# Patient Record
Sex: FEMALE | Race: WHITE | NOT HISPANIC OR LATINO | URBAN - METROPOLITAN AREA
[De-identification: names, ages, dates, MRNs, and addresses within clinical notes are randomized per-mention and may not be internally consistent; named-entity substitution may affect disease eponyms.]

---
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
## 2025-04-28 ENCOUNTER — TRANSCRIBE ORDERS (OUTPATIENT)
Dept: ULTRASOUND IMAGING | Facility: HOSPITAL | Age: 25
End: 2025-04-28
Payer: COMMERCIAL

## 2025-04-28 DIAGNOSIS — O28.3 ABNORMAL FETAL ULTRASOUND: Primary | ICD-10-CM

## 2025-04-29 ENCOUNTER — OFFICE VISIT (OUTPATIENT)
Dept: OBSTETRICS AND GYNECOLOGY | Facility: CLINIC | Age: 25
End: 2025-04-29
Payer: COMMERCIAL

## 2025-04-29 ENCOUNTER — HOSPITAL ENCOUNTER (OUTPATIENT)
Dept: ULTRASOUND IMAGING | Facility: HOSPITAL | Age: 25
Discharge: HOME | End: 2025-04-29
Admitting: OBSTETRICS & GYNECOLOGY
Payer: COMMERCIAL

## 2025-04-29 VITALS
TEMPERATURE: 98.2 F | SYSTOLIC BLOOD PRESSURE: 109 MMHG | BODY MASS INDEX: 30.04 KG/M2 | DIASTOLIC BLOOD PRESSURE: 61 MMHG | HEART RATE: 87 BPM | WEIGHT: 153 LBS | OXYGEN SATURATION: 98 % | HEIGHT: 60 IN

## 2025-04-29 DIAGNOSIS — O28.3 ABNORMAL FETAL ULTRASOUND: ICD-10-CM

## 2025-04-29 DIAGNOSIS — Z3A.22 22 WEEKS GESTATION OF PREGNANCY: Primary | ICD-10-CM

## 2025-04-29 DIAGNOSIS — O35.EXX0 FETAL RENAL ANOMALY, SINGLE GESTATION: ICD-10-CM

## 2025-04-29 PROCEDURE — 76811 OB US DETAILED SNGL FETUS: CPT

## 2025-04-29 RX ORDER — PRENATAL VIT NO.126/IRON/FOLIC 28MG-0.8MG
TABLET ORAL DAILY
COMMUNITY

## 2025-04-29 NOTE — LETTER
2025     Yanna Devries MD  [address]  [facility] [address]  [facility] [address]    Patient: Yanna Johnson   YOB: 2000   Date of Visit: 2025       Dear Yanna Devries MD,    Thank you for referring Yanna Johnson to me for evaluation. Below is a copy of my consult note.    If you have questions, please do not hesitate to call me. I look forward to following Yanna along with you.         Sincerely,        Arline Mcintyre MD        CC: No Recipients    Pt reports that she is doing well and denies vaginal bleeding, cramping, contractions or LOF at this time. Reports active fetal movement. Reviewed when to call OB office or present to L&D for evaluation with symptoms such as decreased fetal movement, vaginal bleeding, LOF or ctxs. Pt verbalized understanding. Denies HA, visual changes or epigastric pain. Denies any additional complaints at time of appointment. Next OB appointment scheduled for .    Vitals:    25 0917   BP: 109/61   Pulse: 87   Temp: 98.2 °F (36.8 °C)   SpO2: 98%          MATERNAL FETAL MEDICINE Consult Note    Dear Dr Yanna Devries MD:    Thank you for your kind referral of Yanna Johnson.  As you know, she is a 24 y.o.   22w2d gestation (Estimated Date of Delivery: 25). This is a consult.      Her antepartum course is complicated by:  Right pelvic kidney     Aneuploidy Screening: Declined, discussed today     HPI: No contractions, LOF, vaginal bleeding. Normal fetal movement.   She denies headache, vision changes, shortness of breath, acute changes in edema, and RUQ pain.     Review of History:  History reviewed. No pertinent past medical history.  History reviewed. No pertinent surgical history.    OB Hx: G1     Social History     Socioeconomic History   • Marital status: Single   Tobacco Use   • Smoking status: Former     Types: Cigarettes   Vaping Use   • Vaping status: Never Used   Substance and Sexual Activity   • Alcohol use: Not Currently   • Drug use: Yes  "    Types: Marijuana     History reviewed. No pertinent family history.   No Known Allergies   Current Outpatient Medications on File Prior to Visit   Medication Sig Dispense Refill   • prenatal vitamin (prenatal, CLASSIC, vitamin) tablet Take  by mouth Daily.       No current facility-administered medications on file prior to visit.        Past obstetric, gynecological, medical, surgical, family and social history reviewed.  Relevant lab work and imaging reviewed.    Review of systems  As above in HPI     Vitals:    04/29/25 0917   BP: 109/61   BP Location: Right arm   Patient Position: Sitting   Pulse: 87   Temp: 98.2 °F (36.8 °C)   TempSrc: Temporal   SpO2: 98%   Weight: 69.4 kg (153 lb)   Height: 152.4 cm (60\")       PHYSICAL EXAM   General: No acute distress  Respiratory: No increased work of breathing  Cardiac: reg rate   Abdominal: Gravid, nontender  Extremities: No significant edema  Neuro/psych: Alert and oriented, appropriate mood      ULTRASOUND   Please view full ultrasound note on Imaging tab in ViewPoint.    Breech presentation  Posterior placenta  Fetal biometry is consistent with dates EFW 33%, AC 32%  Suspect right pelvic kidney, right renal artery is visualized.  The left kidney appears normal.  The fetal anatomic survey was successfully obtained and appears normal with exception of the right pelvic kidney.    Counseled regarding the limitation of ultrasounds.     ASSESSMENT/COUNSELING:    Diagnoses and all orders for this visit:    1. 22 weeks gestation of pregnancy (Primary)    2. Fetal renal anomaly, single gestation         Suspected right pelvic kidney  Suspected visualized  Right pelvic kidney today. Patient referred for concern of right renal agenesis. We reviewed that as pregnancy progresses the kidneys become easier to visualize in pregnancy and at times, the bowel can mimic kidney. I was able to visualize a renal artery that is lower than the left and an area that appears to be consistent " with kidney immediately adjacent to the bladder.   I explained that this would not change the management of her pregnancy. I also explained that the baby only needs one functioning kidney. The normal amniotic fluid suggests normal kidney function.      We discussed ectopic kidneys are usually smaller, may be malrotated, and can develop dilatation and obstruction. I saw no evidence of dilatation today, but postnatally this may present. I explained that this does not alter how the baby will do in the immediate  period and should not affect her delivery planning.     When the contralateral kidney is not involved and has normal function, as appears to be the case here, the prenatal course is typically unremarkable and  outcome favorable. There is an increased risk of UTI and later in life of CHTN.       There is not a high rate of known major chromosome disorders linked to this prenatal finding. We discussed the option of NIPS, she declined.   We will send her to peds uro at her next appointment.     Reevaluate kidneys at 28 weeks.     Summary of Plan  -Reevaluation of the fetal kidneys at 28 and 34 weeks   -Serial growth ultrasounds every 4  weeks (by primary OB)   -Starting at 28 - 32 weeks: Weekly fetal  surveillance until delivery   -Starting at 28 weeks: Fetal movement instructions given continue daily until delivery; instructed to report to labor and delivery if cannot achieve more than 10 kicks in one hour or if she perceives a decrease in fetal movement    Follow-up: 6 WEEKS     Thank you for the consult and opportunity to care for this patient.  Please feel free to reach out with any questions or concerns.    I spent 45 minutes caring for this patient on this date of service. This time includes time spent by me in the following activities: preparing for the visit, reviewing tests, obtaining and/or reviewing a separately obtained history, performing a medically appropriate examination  and/or evaluation, counseling and educating the patient/family/caregiver and independently interpreting results and communicating that information with the patient/family/caregiver with greater than 50% spent in counseling and coordination of care.         I spent 10 minutes on the separately reported service of US imaging not included in the time used to support the E/M service also reported today.      Arline Mcintyre MD   Maternal Fetal Medicine-[facility]  Office: [phone]

## 2025-04-29 NOTE — PROGRESS NOTES
"MATERNAL FETAL MEDICINE Consult Note    Dear Dr Yanna Devries MD:    Thank you for your kind referral of Yanna Johnson.  As you know, she is a 24 y.o.   22w2d gestation (Estimated Date of Delivery: 25). This is a consult.      Her antepartum course is complicated by:  Right pelvic kidney     Aneuploidy Screening: Declined, discussed today     HPI: No contractions, LOF, vaginal bleeding. Normal fetal movement.   She denies headache, vision changes, shortness of breath, acute changes in edema, and RUQ pain.     Review of History:  History reviewed. No pertinent past medical history.  History reviewed. No pertinent surgical history.    OB Hx: G1     Social History     Socioeconomic History    Marital status: Single   Tobacco Use    Smoking status: Former     Types: Cigarettes   Vaping Use    Vaping status: Never Used   Substance and Sexual Activity    Alcohol use: Not Currently    Drug use: Yes     Types: Marijuana     History reviewed. No pertinent family history.   No Known Allergies   Current Outpatient Medications on File Prior to Visit   Medication Sig Dispense Refill    prenatal vitamin (prenatal, CLASSIC, vitamin) tablet Take  by mouth Daily.       No current facility-administered medications on file prior to visit.        Past obstetric, gynecological, medical, surgical, family and social history reviewed.  Relevant lab work and imaging reviewed.    Review of systems  As above in HPI     Vitals:    25 0917   BP: 109/61   BP Location: Right arm   Patient Position: Sitting   Pulse: 87   Temp: 98.2 °F (36.8 °C)   TempSrc: Temporal   SpO2: 98%   Weight: 69.4 kg (153 lb)   Height: 152.4 cm (60\")       PHYSICAL EXAM   General: No acute distress  Respiratory: No increased work of breathing  Cardiac: reg rate   Abdominal: Gravid, nontender  Extremities: No significant edema  Neuro/psych: Alert and oriented, appropriate mood      ULTRASOUND   Please view full ultrasound note on Imaging tab in " ViewPoint.    Breech presentation  Posterior placenta  Fetal biometry is consistent with dates EFW 33%, AC 32%  Suspect right pelvic kidney, right renal artery is visualized.  The left kidney appears normal.  The fetal anatomic survey was successfully obtained and appears normal with exception of the right pelvic kidney.    Counseled regarding the limitation of ultrasounds.     ASSESSMENT/COUNSELING:    Diagnoses and all orders for this visit:    1. 22 weeks gestation of pregnancy (Primary)    2. Fetal renal anomaly, single gestation         Suspected right pelvic kidney  Suspected visualized  Right pelvic kidney today. Patient referred for concern of right renal agenesis. We reviewed that as pregnancy progresses the kidneys become easier to visualize in pregnancy and at times, the bowel can mimic kidney. I was able to visualize a renal artery that is lower than the left and an area that appears to be consistent with kidney immediately adjacent to the bladder.   I explained that this would not change the management of her pregnancy. I also explained that the baby only needs one functioning kidney. The normal amniotic fluid suggests normal kidney function.      We discussed ectopic kidneys are usually smaller, may be malrotated, and can develop dilatation and obstruction. I saw no evidence of dilatation today, but postnatally this may present. I explained that this does not alter how the baby will do in the immediate  period and should not affect her delivery planning.     When the contralateral kidney is not involved and has normal function, as appears to be the case here, the prenatal course is typically unremarkable and  outcome favorable. There is an increased risk of UTI and later in life of CHTN.       There is not a high rate of known major chromosome disorders linked to this prenatal finding. We discussed the option of NIPS, she declined.   We will send her to peds uro at her next  appointment.     Reevaluate kidneys at 28 weeks.     Summary of Plan  -Reevaluation of the fetal kidneys at 28 and 34 weeks   -Serial growth ultrasounds every 4  weeks (by primary OB)   -Starting at 28 - 32 weeks: Weekly fetal  surveillance until delivery   -Starting at 28 weeks: Fetal movement instructions given continue daily until delivery; instructed to report to labor and delivery if cannot achieve more than 10 kicks in one hour or if she perceives a decrease in fetal movement    Follow-up: 6 WEEKS     Thank you for the consult and opportunity to care for this patient.  Please feel free to reach out with any questions or concerns.    I spent 45 minutes caring for this patient on this date of service. This time includes time spent by me in the following activities: preparing for the visit, reviewing tests, obtaining and/or reviewing a separately obtained history, performing a medically appropriate examination and/or evaluation, counseling and educating the patient/family/caregiver and independently interpreting results and communicating that information with the patient/family/caregiver with greater than 50% spent in counseling and coordination of care.         I spent 10 minutes on the separately reported service of US imaging not included in the time used to support the E/M service also reported today.      Arline Mcintyre MD   Maternal Fetal Medicine-[facility]  Office: [phone]

## 2025-04-29 NOTE — PROGRESS NOTES
Pt reports that she is doing well and denies vaginal bleeding, cramping, contractions or LOF at this time. Reports active fetal movement. Reviewed when to call OB office or present to L&D for evaluation with symptoms such as decreased fetal movement, vaginal bleeding, LOF or ctxs. Pt verbalized understanding. Denies HA, visual changes or epigastric pain. Denies any additional complaints at time of appointment. Next OB appointment scheduled for 5/6.    Vitals:    04/29/25 0917   BP: 109/61   Pulse: 87   Temp: 98.2 °F (36.8 °C)   SpO2: 98%

## 2025-06-09 ENCOUNTER — TRANSCRIBE ORDERS (OUTPATIENT)
Dept: ULTRASOUND IMAGING | Facility: HOSPITAL | Age: 25
End: 2025-06-09
Payer: COMMERCIAL

## 2025-06-09 DIAGNOSIS — O28.3 ABNORMAL FETAL ULTRASOUND: Primary | ICD-10-CM

## 2025-06-10 ENCOUNTER — HOSPITAL ENCOUNTER (OUTPATIENT)
Dept: ULTRASOUND IMAGING | Facility: HOSPITAL | Age: 25
Discharge: HOME | End: 2025-06-10
Admitting: STUDENT IN AN ORGANIZED HEALTH CARE EDUCATION/TRAINING PROGRAM
Payer: COMMERCIAL

## 2025-06-10 ENCOUNTER — OFFICE VISIT (OUTPATIENT)
Dept: OBSTETRICS AND GYNECOLOGY | Facility: CLINIC | Age: 25
End: 2025-06-10
Payer: COMMERCIAL

## 2025-06-10 VITALS
SYSTOLIC BLOOD PRESSURE: 126 MMHG | HEIGHT: 60 IN | WEIGHT: 158.44 LBS | DIASTOLIC BLOOD PRESSURE: 67 MMHG | BODY MASS INDEX: 31.11 KG/M2 | OXYGEN SATURATION: 99 % | HEART RATE: 86 BPM | TEMPERATURE: 98 F

## 2025-06-10 DIAGNOSIS — O35.EXX0 FETAL RENAL ANOMALY, SINGLE GESTATION: Primary | ICD-10-CM

## 2025-06-10 DIAGNOSIS — O36.5990 FETAL GROWTH RESTRICTION ANTEPARTUM: ICD-10-CM

## 2025-06-10 DIAGNOSIS — O28.3 ABNORMAL FETAL ULTRASOUND: ICD-10-CM

## 2025-06-10 DIAGNOSIS — O36.5990 FETAL GROWTH RESTRICTION ANTEPARTUM: Primary | ICD-10-CM

## 2025-06-10 PROCEDURE — 76816 OB US FOLLOW-UP PER FETUS: CPT

## 2025-06-10 PROCEDURE — 76819 FETAL BIOPHYS PROFIL W/O NST: CPT

## 2025-06-10 NOTE — PROGRESS NOTES
MATERNAL FETAL MEDICINE CONSULT NOTE      Dear Dr Yanna Devries MD:    Thank you for your kind referral of Yanna Johnson.  As you know, she is a 24 y.o.   28w2d gestation (Estimated Date of Delivery: 25). This is a consult.     HER ANTEPARTUM COURSE IS COMPLICATED BY:  Right Pelvic Kidney   Fetal Growth Restriction, Normal Dopplers    ANEUPLOIDY SCREENING: Declined  CARRIER SCREENING: Declined    HPI: Today, denies contractions, LOF, vaginal bleeding. Reports normal fetal movement.   She denies headache, vision changes, shortness of breath, acute changes in edema, and RUQ pain.     REVIEW OF HISTORY  History reviewed. No pertinent past medical history.  History reviewed. No pertinent surgical history.    OB History    Para Term  AB Living   1 0 0 0 0 0   SAB IAB Ectopic Molar Multiple Live Births   0 0 0 0 0 0      # Outcome Date GA Lbr Florian/2nd Weight Sex Type Anes PTL Lv   1 Current              Social History     Socioeconomic History    Marital status: Single   Tobacco Use    Smoking status: Former     Types: Cigarettes    Smokeless tobacco: Never   Vaping Use    Vaping status: Never Used   Substance and Sexual Activity    Alcohol use: Not Currently    Drug use: Not Currently     Types: Marijuana     History reviewed. No pertinent family history.   No Known Allergies   Current Outpatient Medications on File Prior to Visit   Medication Sig Dispense Refill    prenatal vitamin (prenatal, CLASSIC, vitamin) tablet Take  by mouth Daily. (Patient not taking: Reported on 6/10/2025)       No current facility-administered medications on file prior to visit.        Past obstetric, gynecological, medical, surgical, family and social history reviewed.  Relevant lab work and imaging reviewed.    REVIEW OF SYSTEMS  As above in HPI    Vitals:    06/10/25 1013   BP: 126/67   BP Location: Right arm   Patient Position: Sitting   Pulse: 86   Temp: 98 °F (36.7 °C)   TempSrc: Temporal   SpO2: 99%   Weight: 71.9  "kg (158 lb 7 oz)   Height: 152.4 cm (60\")     Wt Readings from Last 3 Encounters:   06/10/25 71.9 kg (158 lb 7 oz)   04/29/25 69.4 kg (153 lb)     BP Readings from Last 3 Encounters:   06/10/25 126/67   04/29/25 109/61     Pulse Readings from Last 3 Encounters:   06/10/25 86   04/29/25 87     Pregnancy Episode    PHYSICAL EXAM   General: No acute distress, pleasant, AAO x 3  Respiratory: No increased work of breathing, speaking in complete sentences without difficulty, symmetric chest rise  Cardiac: Regular rate   abdominal: Gravid, nontender  Extremities: No significant edema  Neuro/psych: Awake, Alert and oriented, appropriate mood/affect    ULTRASOUND   A full ultrasound report can be found under imaging tab once finalized and signed off by reading physician        ASSESSMENT AND PLAN  Diagnoses and all orders for this visit:    1. Fetal renal anomaly, single gestation (Primary)    2. Fetal growth restriction antepartum         RIGHT PELVIC KIDNEY  Previously Counseled.  Right pelvic kidney today.   AGUSTIN is 14 cm, which is normal - the normal amniotic fluid suggests normal kidney function.   Declined NIPT  Peds Uro Referral ordered  Re-evaluate fetal kidneys at 34 weeks    FETAL GROWTH RESTRICTION, NORMAL UMBILICAL ARTERY DOPPLERS  Fetal growth restriction is defined as an EFW or AC measurement of < 10%. Today she has an AC of 9% and meets requirements for FGR diagnosis.   I counseled them on constitutional small vs pathologic causes of fetal growth restriction FGR and management plan according to ACOG/SMFM recommendations. All questions addressed.  We discussed that a fetus can be small secondary to either consitutional or pathologic causes.  Constitutional occurs because of maternal or paternal small status that is inherited in the fetus.  Pathologic causes result from maternal medical conditions, fetal conditions, teratogen exposure, or placental insufficiency including Pre-eclampsia.    FGR is assoicated with " increased risk of stillbirth, abnormal heart rates patterns,  delivery, oligohydramnios, neurodevelopmental delay and cerebral palsy.     She declined genetic screening this pregnancy and baby has right pelvic kidney. I did  her that I can never rule out genetic causes by ultrasound.  With congenital infections, we often see profound CNS findings such as ventriculomegaly, echogenic bowel, and even hydrops.  We do not see any of this today.  Patient would like to have infectious studies (CMV IgG and IgM / Toxo IgG and IgM) today. Patient declined NIPT again today.     She has no underlying maternal medical conditions to account for poor nutrient transport across the placenta.  She does not have pre-eclampsia at this time, but we will continue close surveillance with weekly BP's as this can occur after FGR onset.  I do not see any evidence of fetal infection or fetal cause for FGR.   I discussed the risk of  morbidity,  birth, pre-eclamspia, and stillbirth as the reasons for increased surveillance to prevent these complications if possible.      At this time, we will do twice weekly ANFS with weekly UA dopplers completed at [facility] office.  Will scan for growth every 3-4 weeks after short interval growth in 2 weeks. We would consider admission for corticosteroids, Pre-eclampsia evaluation, and NICU consult if dopplers worsen, or oligohydramnios is noted.  I discussed the increased risk of  morbidity,  birth, and stillbirth related to FGR along with placental abruption and pre-eclampsia.  Delivery timing will be determined based upon interval growth, dopplers, fetal testing, and maternal blood pressures.  Magnesium sulfate for neuroprotection if delivery indicated < 32 weeks.      She doesn't have any readily apparent robust risk factors: she is not a chronic hypertensive and does not smoke, but I counseled her that sometimes we do not know the cause and that this happens early  on when the placenta is developing and it is nothing the patient did or didn't do and she did not cause this.  We discussed that once FGR with placental dysfunction is present, it typically worsens over time during the pregnancy.  Timing of delivery is guided by the results  testing of fetal well being (ultrasound BPP, FHR tracing findings) in addition to Doppler findings (umbilical artery, MCA, and ductus venosus).  Today we have NORMAL umbilical artery dopplers with an 8/8 BPP which is reassuring.      We discussed umbilical artery doppler studies as 4 possible levels: normal, elevated, absent, and reversed.  We discussed that absent and reversed would require admission,  corticosteroids, and possibly delivery depending on gestational age and other testing.        I would recommend limitation of activities, daily fetal movement assessment, and twice weekly ANFS with BPP and Doppler studies.  A worsening of findings on testing would indicate an increased risk of indicated delivery, and inpatient management may be recommended at that time(with steroids).      Patient states she is going to primary OB today for glucose test. She would like to have infectious studies drawn at her primary OB office. Informed Laura Steven NP that pt needed these tests done today. She said she will take care of it. Pt declines NIPT but informed her to let us know if she changes her mind so we can order that and get it sent out to the lab. States understanding. Her biggest concern with NIPT is the cost.  Discussed max out of pocket and financial assistance with [facility]. Pt will consider but declines today.      SUMMARY OF PLAN  -Referral to peds urology ordered  -Toxo/CMV IgG and IgM being drawn today at primary OB office  -Pt declines NIPT  -Short interval growth ultrasound in 2 weeks (MFM)  -Serial growth ultrasound every 3-4 weeks after short interval (MFM)  -Twice Weekly fetal  surveillance until delivery (Split  between MFM (Thursday) and primary OB (Monday))   --MFM will do BPP/UAD   --Ideally if possible, schedule about 2 - 3 days between weekly MFM and OB assessments.   - Fetal movement instructions given continue daily until delivery; instructed to report to labor and delivery if cannot achieve more than 10 kicks in one hour or if she perceives a decrease in fetal movement  -Continue routine prenatal care with primary ob team   -At this time, delivery is recommended at 38-39 weeks, unless indicated sooner for maternal or fetal reasons    Follow-up: Weekly    Future Appointments   Date Time Provider Department Center   7/22/2025  9:00 AM [facility] US 1 [facility]   7/22/2025  9:30 AM Arline Mcintyre MD [facility]     Thank you for the consult and opportunity to care for this patient.  Please feel free to reach out with any questions or concerns.      I confirm that all copied/forwarded documentation in this record has been carefully reviewed, updated as necessary, and accurately reflects the patient's current status and plan of care.      I spent 35 minutes caring for this patient on this date of service. This time includes time spent by me in the following activities: preparing for the visit, reviewing tests, obtaining and/or reviewing a separately obtained history, performing a medically appropriate examination and/or evaluation, counseling and educating the patient/family/caregiver and independently interpreting results and communicating that information with the patient/family/caregiver with greater than 50% spent in counseling and coordination of care.     JAQUAN Man  6/10/2025  Maternal Fetal Medicine-[facility]  Office: [phone]

## 2025-06-10 NOTE — PROGRESS NOTES
Pt reports that she is doing well and denies vaginal bleeding, cramping, contractions or LOF at this time. Reports active fetal movement. Reviewed when to call OB office or present to L&D for evaluation with symptoms such as decreased fetal movement, vaginal bleeding, LOF or ctxs. Pt verbalized understanding. Denies HA, visual changes or epigastric pain. Denies any additional complaints at time of appointment. Next OB appointment scheduled for 6/10.        Vitals:    06/10/25 1013   BP: 126/67   Pulse: 86   Temp: 98 °F (36.7 °C)   SpO2: 99%

## 2025-06-10 NOTE — LETTER
Anjali 10, 2025     Yanna Devries MD  [address]  [facility] [address]  [facility] [address]    Patient: Yanna Johnson   YOB: 2000   Date of Visit: 6/10/2025       Dear Yanna Devries MD,    Thank you for referring Yanna Johnson to me for evaluation. Below is a copy of my consult note.    If you have questions, please do not hesitate to call me. I look forward to following Yanna along with you.         Sincerely,        JAQUAN Christopher        CC: No Recipients    MATERNAL FETAL MEDICINE CONSULT NOTE      Dear Dr Yanna Devries MD:    Thank you for your kind referral of Yanna Johnson.  As you know, she is a 24 y.o.   28w2d gestation (Estimated Date of Delivery: 25). This is a consult.     HER ANTEPARTUM COURSE IS COMPLICATED BY:  Right Pelvic Kidney   Fetal Growth Restriction, Normal Dopplers    ANEUPLOIDY SCREENING: Declined  CARRIER SCREENING: Declined    HPI: Today, denies contractions, LOF, vaginal bleeding. Reports normal fetal movement.   She denies headache, vision changes, shortness of breath, acute changes in edema, and RUQ pain.     REVIEW OF HISTORY  History reviewed. No pertinent past medical history.  History reviewed. No pertinent surgical history.    OB History    Para Term  AB Living   1 0 0 0 0 0   SAB IAB Ectopic Molar Multiple Live Births   0 0 0 0 0 0      # Outcome Date GA Lbr Florian/2nd Weight Sex Type Anes PTL Lv   1 Current              Social History     Socioeconomic History   • Marital status: Single   Tobacco Use   • Smoking status: Former     Types: Cigarettes   • Smokeless tobacco: Never   Vaping Use   • Vaping status: Never Used   Substance and Sexual Activity   • Alcohol use: Not Currently   • Drug use: Not Currently     Types: Marijuana     History reviewed. No pertinent family history.   No Known Allergies   Current Outpatient Medications on File Prior to Visit   Medication Sig Dispense Refill   • prenatal vitamin (prenatal, CLASSIC, vitamin) tablet  "Take  by mouth Daily. (Patient not taking: Reported on 6/10/2025)       No current facility-administered medications on file prior to visit.        Past obstetric, gynecological, medical, surgical, family and social history reviewed.  Relevant lab work and imaging reviewed.    REVIEW OF SYSTEMS  As above in HPI    Vitals:    06/10/25 1013   BP: 126/67   BP Location: Right arm   Patient Position: Sitting   Pulse: 86   Temp: 98 °F (36.7 °C)   TempSrc: Temporal   SpO2: 99%   Weight: 71.9 kg (158 lb 7 oz)   Height: 152.4 cm (60\")     Wt Readings from Last 3 Encounters:   06/10/25 71.9 kg (158 lb 7 oz)   04/29/25 69.4 kg (153 lb)     BP Readings from Last 3 Encounters:   06/10/25 126/67   04/29/25 109/61     Pulse Readings from Last 3 Encounters:   06/10/25 86   04/29/25 87     Pregnancy Episode    PHYSICAL EXAM   General: No acute distress, pleasant, AAO x 3  Respiratory: No increased work of breathing, speaking in complete sentences without difficulty, symmetric chest rise  Cardiac: Regular rate   abdominal: Gravid, nontender  Extremities: No significant edema  Neuro/psych: Awake, Alert and oriented, appropriate mood/affect    ULTRASOUND   A full ultrasound report can be found under imaging tab once finalized and signed off by reading physician        ASSESSMENT AND PLAN  Diagnoses and all orders for this visit:    1. Fetal renal anomaly, single gestation (Primary)    2. Fetal growth restriction antepartum         RIGHT PELVIC KIDNEY  Previously Counseled.  Right pelvic kidney today.   AGUSTIN is 14 cm, which is normal - the normal amniotic fluid suggests normal kidney function.   Declined NIPT  Peds Uro Referral ordered  Re-evaluate fetal kidneys at 34 weeks    FETAL GROWTH RESTRICTION, NORMAL UMBILICAL ARTERY DOPPLERS  Fetal growth restriction is defined as an EFW or AC measurement of < 10%. Today she has an AC of 9% and meets requirements for FGR diagnosis.   I counseled them on constitutional small vs pathologic causes " of fetal growth restriction FGR and management plan according to ACOG/SMFM recommendations. All questions addressed.  We discussed that a fetus can be small secondary to either consitutional or pathologic causes.  Constitutional occurs because of maternal or paternal small status that is inherited in the fetus.  Pathologic causes result from maternal medical conditions, fetal conditions, teratogen exposure, or placental insufficiency including Pre-eclampsia.    FGR is assoicated with increased risk of stillbirth, abnormal heart rates patterns,  delivery, oligohydramnios, neurodevelopmental delay and cerebral palsy.     She declined genetic screening this pregnancy and baby has right pelvic kidney. I did  her that I can never rule out genetic causes by ultrasound.  With congenital infections, we often see profound CNS findings such as ventriculomegaly, echogenic bowel, and even hydrops.  We do not see any of this today.  Patient would like to have infectious studies (CMV IgG and IgM / Toxo IgG and IgM) today. Patient declined NIPT again today.     She has no underlying maternal medical conditions to account for poor nutrient transport across the placenta.  She does not have pre-eclampsia at this time, but we will continue close surveillance with weekly BP's as this can occur after FGR onset.  I do not see any evidence of fetal infection or fetal cause for FGR.   I discussed the risk of  morbidity,  birth, pre-eclamspia, and stillbirth as the reasons for increased surveillance to prevent these complications if possible.      At this time, we will do twice weekly ANFS with weekly UA dopplers completed at [facility] office.  Will scan for growth every 3-4 weeks after short interval growth in 2 weeks. We would consider admission for corticosteroids, Pre-eclampsia evaluation, and NICU consult if dopplers worsen, or oligohydramnios is noted.  I discussed the increased risk of  morbidity,   birth, and stillbirth related to FGR along with placental abruption and pre-eclampsia.  Delivery timing will be determined based upon interval growth, dopplers, fetal testing, and maternal blood pressures.  Magnesium sulfate for neuroprotection if delivery indicated < 32 weeks.      She doesn't have any readily apparent robust risk factors: she is not a chronic hypertensive and does not smoke, but I counseled her that sometimes we do not know the cause and that this happens early on when the placenta is developing and it is nothing the patient did or didn't do and she did not cause this.  We discussed that once FGR with placental dysfunction is present, it typically worsens over time during the pregnancy.  Timing of delivery is guided by the results  testing of fetal well being (ultrasound BPP, FHR tracing findings) in addition to Doppler findings (umbilical artery, MCA, and ductus venosus).  Today we have NORMAL umbilical artery dopplers with an 8/8 BPP which is reassuring.      We discussed umbilical artery doppler studies as 4 possible levels: normal, elevated, absent, and reversed.  We discussed that absent and reversed would require admission,  corticosteroids, and possibly delivery depending on gestational age and other testing.        I would recommend limitation of activities, daily fetal movement assessment, and twice weekly ANFS with BPP and Doppler studies.  A worsening of findings on testing would indicate an increased risk of indicated delivery, and inpatient management may be recommended at that time(with steroids).      Patient states she is going to primary OB today for glucose test. She would like to have infectious studies drawn at her primary OB office. Informed Laura Steven NP that pt needed these tests done today. She said she will take care of it. Pt declines NIPT but informed her to let us know if she changes her mind so we can order that and get it sent out to the lab.  States understanding. Her biggest concern with NIPT is the cost.  Discussed max out of pocket and financial assistance with [facility]. Pt will consider but declines today.      SUMMARY OF PLAN  -Referral to peds urology ordered  -Toxo/CMV IgG and IgM being drawn today at primary OB office  -Pt declines NIPT  -Short interval growth ultrasound in 2 weeks (MFM)  -Serial growth ultrasound every 3-4 weeks after short interval (MFM)  -Twice Weekly fetal  surveillance until delivery (Split between MFM (Thursday) and primary OB (Monday))   --MFM will do BPP/UAD   --Ideally if possible, schedule about 2 - 3 days between weekly MFM and OB assessments.   - Fetal movement instructions given continue daily until delivery; instructed to report to labor and delivery if cannot achieve more than 10 kicks in one hour or if she perceives a decrease in fetal movement  -Continue routine prenatal care with primary ob team   -At this time, delivery is recommended at 38-39 weeks, unless indicated sooner for maternal or fetal reasons    Follow-up: Weekly    Future Appointments   Date Time Provider Department Center   2025  9:00 AM [facility] US 1  [facility]   2025  9:30 AM Arline Mcintyre MD [facility]     Thank you for the consult and opportunity to care for this patient.  Please feel free to reach out with any questions or concerns.      I confirm that all copied/forwarded documentation in this record has been carefully reviewed, updated as necessary, and accurately reflects the patient's current status and plan of care.      I spent 35 minutes caring for this patient on this date of service. This time includes time spent by me in the following activities: preparing for the visit, reviewing tests, obtaining and/or reviewing a separately obtained history, performing a medically appropriate examination and/or evaluation, counseling and educating the patient/family/caregiver and independently interpreting results and  communicating that information with the patient/family/caregiver with greater than 50% spent in counseling and coordination of care.     Malgorzata Seals, JAQUAN  6/10/2025  Maternal Fetal Medicine-[facility]  Office: [phone]    Pt reports that she is doing well and denies vaginal bleeding, cramping, contractions or LOF at this time. Reports active fetal movement. Reviewed when to call OB office or present to L&D for evaluation with symptoms such as decreased fetal movement, vaginal bleeding, LOF or ctxs. Pt verbalized understanding. Denies HA, visual changes or epigastric pain. Denies any additional complaints at time of appointment. Next OB appointment scheduled for 6/10.        Vitals:    06/10/25 1013   BP: 126/67   Pulse: 86   Temp: 98 °F (36.7 °C)   SpO2: 99%

## 2025-06-10 NOTE — LETTER
Anjali 10, 2025     Yanna Devries MD  [address]  [facility] [address]  [facility] [address]    Patient: Yanna Johnson   YOB: 2000   Date of Visit: 6/10/2025       Dear Yanna Devries MD    Yanna Johnson was in my office today. Below is a copy of my note.    If you have questions, please do not hesitate to call me. I look forward to following Yanna along with you.         Sincerely,        JAQUAN Christopher        CC: No Recipients    MATERNAL FETAL MEDICINE CONSULT NOTE      Dear Dr Yanna Devries MD:    Thank you for your kind referral of Yanna Johnson.  As you know, she is a 24 y.o.   28w2d gestation (Estimated Date of Delivery: 25). This is a consult.     HER ANTEPARTUM COURSE IS COMPLICATED BY:  Right Pelvic Kidney   Fetal Growth Restriction, Normal Dopplers    ANEUPLOIDY SCREENING: Declined  CARRIER SCREENING: Declined    HPI: Today, denies contractions, LOF, vaginal bleeding. Reports normal fetal movement.   She denies headache, vision changes, shortness of breath, acute changes in edema, and RUQ pain.     REVIEW OF HISTORY  History reviewed. No pertinent past medical history.  History reviewed. No pertinent surgical history.    OB History    Para Term  AB Living   1 0 0 0 0 0   SAB IAB Ectopic Molar Multiple Live Births   0 0 0 0 0 0      # Outcome Date GA Lbr Florian/2nd Weight Sex Type Anes PTL Lv   1 Current              Social History     Socioeconomic History   • Marital status: Single   Tobacco Use   • Smoking status: Former     Types: Cigarettes   • Smokeless tobacco: Never   Vaping Use   • Vaping status: Never Used   Substance and Sexual Activity   • Alcohol use: Not Currently   • Drug use: Not Currently     Types: Marijuana     History reviewed. No pertinent family history.   No Known Allergies   Current Outpatient Medications on File Prior to Visit   Medication Sig Dispense Refill   • prenatal vitamin (prenatal, CLASSIC, vitamin) tablet Take  by mouth Daily. (Patient not  "taking: Reported on 6/10/2025)       No current facility-administered medications on file prior to visit.        Past obstetric, gynecological, medical, surgical, family and social history reviewed.  Relevant lab work and imaging reviewed.    REVIEW OF SYSTEMS  As above in HPI    Vitals:    06/10/25 1013   BP: 126/67   BP Location: Right arm   Patient Position: Sitting   Pulse: 86   Temp: 98 °F (36.7 °C)   TempSrc: Temporal   SpO2: 99%   Weight: 71.9 kg (158 lb 7 oz)   Height: 152.4 cm (60\")     Wt Readings from Last 3 Encounters:   06/10/25 71.9 kg (158 lb 7 oz)   04/29/25 69.4 kg (153 lb)     BP Readings from Last 3 Encounters:   06/10/25 126/67   04/29/25 109/61     Pulse Readings from Last 3 Encounters:   06/10/25 86   04/29/25 87     Pregnancy Episode    PHYSICAL EXAM   General: No acute distress, pleasant, AAO x 3  Respiratory: No increased work of breathing, speaking in complete sentences without difficulty, symmetric chest rise  Cardiac: Regular rate   abdominal: Gravid, nontender  Extremities: No significant edema  Neuro/psych: Awake, Alert and oriented, appropriate mood/affect    ULTRASOUND   A full ultrasound report can be found under imaging tab once finalized and signed off by reading physician        ASSESSMENT AND PLAN  Diagnoses and all orders for this visit:    1. Fetal renal anomaly, single gestation (Primary)    2. Fetal growth restriction antepartum         RIGHT PELVIC KIDNEY  Previously Counseled.  Right pelvic kidney today.   AGUSTIN is 14 cm, which is normal - the normal amniotic fluid suggests normal kidney function.   Declined NIPT  Peds Uro Referral ordered  Re-evaluate fetal kidneys at 34 weeks    FETAL GROWTH RESTRICTION, NORMAL UMBILICAL ARTERY DOPPLERS  Fetal growth restriction is defined as an EFW or AC measurement of < 10%. Today she has an AC of 9% and meets requirements for FGR diagnosis.   I counseled them on constitutional small vs pathologic causes of fetal growth restriction FGR " and management plan according to ACOG/SMFM recommendations. All questions addressed.  We discussed that a fetus can be small secondary to either consitutional or pathologic causes.  Constitutional occurs because of maternal or paternal small status that is inherited in the fetus.  Pathologic causes result from maternal medical conditions, fetal conditions, teratogen exposure, or placental insufficiency including Pre-eclampsia.    FGR is assoicated with increased risk of stillbirth, abnormal heart rates patterns,  delivery, oligohydramnios, neurodevelopmental delay and cerebral palsy.     She declined genetic screening this pregnancy and baby has right pelvic kidney. I did  her that I can never rule out genetic causes by ultrasound.  With congenital infections, we often see profound CNS findings such as ventriculomegaly, echogenic bowel, and even hydrops.  We do not see any of this today.  Patient would like to have infectious studies (CMV IgG and IgM / Toxo IgG and IgM) today. Patient declined NIPT again today.     She has no underlying maternal medical conditions to account for poor nutrient transport across the placenta.  She does not have pre-eclampsia at this time, but we will continue close surveillance with weekly BP's as this can occur after FGR onset.  I do not see any evidence of fetal infection or fetal cause for FGR.   I discussed the risk of  morbidity,  birth, pre-eclamspia, and stillbirth as the reasons for increased surveillance to prevent these complications if possible.      At this time, we will do twice weekly ANFS with weekly UA dopplers completed at [facility] office.  Will scan for growth every 3-4 weeks after short interval growth in 2 weeks. We would consider admission for corticosteroids, Pre-eclampsia evaluation, and NICU consult if dopplers worsen, or oligohydramnios is noted.  I discussed the increased risk of  morbidity,  birth, and stillbirth  related to FGR along with placental abruption and pre-eclampsia.  Delivery timing will be determined based upon interval growth, dopplers, fetal testing, and maternal blood pressures.  Magnesium sulfate for neuroprotection if delivery indicated < 32 weeks.      She doesn't have any readily apparent robust risk factors: she is not a chronic hypertensive and does not smoke, but I counseled her that sometimes we do not know the cause and that this happens early on when the placenta is developing and it is nothing the patient did or didn't do and she did not cause this.  We discussed that once FGR with placental dysfunction is present, it typically worsens over time during the pregnancy.  Timing of delivery is guided by the results  testing of fetal well being (ultrasound BPP, FHR tracing findings) in addition to Doppler findings (umbilical artery, MCA, and ductus venosus).  Today we have NORMAL umbilical artery dopplers with an 8/8 BPP which is reassuring.      We discussed umbilical artery doppler studies as 4 possible levels: normal, elevated, absent, and reversed.  We discussed that absent and reversed would require admission,  corticosteroids, and possibly delivery depending on gestational age and other testing.        I would recommend limitation of activities, daily fetal movement assessment, and twice weekly ANFS with BPP and Doppler studies.  A worsening of findings on testing would indicate an increased risk of indicated delivery, and inpatient management may be recommended at that time(with steroids).      Patient states she is going to primary OB today for glucose test. She would like to have infectious studies drawn at her primary OB office. Informed Laura Steven NP that pt needed these tests done today. She said she will take care of it. Pt declines NIPT but informed her to let us know if she changes her mind so we can order that and get it sent out to the lab. States understanding. Her biggest  concern with NIPT is the cost.  Discussed max out of pocket and financial assistance with [facility]. Pt will consider but declines today.      SUMMARY OF PLAN  -Referral to peds urology ordered  -Toxo/CMV IgG and IgM being drawn today at primary OB office  -Pt declines NIPT  -Short interval growth ultrasound in 2 weeks (MFM)  -Serial growth ultrasound every 3-4 weeks after short interval (MFM)  -Twice Weekly fetal  surveillance until delivery (Split between MFM (Thursday) and primary OB (Monday))   --MFM will do BPP/UAD   --Ideally if possible, schedule about 2 - 3 days between weekly MFM and OB assessments.   - Fetal movement instructions given continue daily until delivery; instructed to report to labor and delivery if cannot achieve more than 10 kicks in one hour or if she perceives a decrease in fetal movement  -Continue routine prenatal care with primary ob team   -At this time, delivery is recommended at 38-39 weeks, unless indicated sooner for maternal or fetal reasons    Follow-up: Weekly    Future Appointments   Date Time Provider Department Center   2025  9:00 AM [facility] US 1 [facility]   2025  9:30 AM Arline Mcintyre MD [facility]     Thank you for the consult and opportunity to care for this patient.  Please feel free to reach out with any questions or concerns.      I confirm that all copied/forwarded documentation in this record has been carefully reviewed, updated as necessary, and accurately reflects the patient's current status and plan of care.      I spent 35 minutes caring for this patient on this date of service. This time includes time spent by me in the following activities: preparing for the visit, reviewing tests, obtaining and/or reviewing a separately obtained history, performing a medically appropriate examination and/or evaluation, counseling and educating the patient/family/caregiver and independently interpreting results and communicating that information  with the patient/family/caregiver with greater than 50% spent in counseling and coordination of care.     Malgorzata SealsJAQUAN  6/10/2025  Maternal Fetal Medicine-[facility]  Office: [phone]    Pt reports that she is doing well and denies vaginal bleeding, cramping, contractions or LOF at this time. Reports active fetal movement. Reviewed when to call OB office or present to L&D for evaluation with symptoms such as decreased fetal movement, vaginal bleeding, LOF or ctxs. Pt verbalized understanding. Denies HA, visual changes or epigastric pain. Denies any additional complaints at time of appointment. Next OB appointment scheduled for 6/10.        Vitals:    06/10/25 1013   BP: 126/67   Pulse: 86   Temp: 98 °F (36.7 °C)   SpO2: 99%

## 2025-06-16 ENCOUNTER — TRANSCRIBE ORDERS (OUTPATIENT)
Dept: ULTRASOUND IMAGING | Facility: HOSPITAL | Age: 25
End: 2025-06-16
Payer: COMMERCIAL

## 2025-06-16 DIAGNOSIS — O36.5990 FETAL GROWTH RESTRICTION ANTEPARTUM: Primary | ICD-10-CM

## 2025-06-16 DIAGNOSIS — O28.3 ABNORMAL FETAL ULTRASOUND: Primary | ICD-10-CM

## 2025-06-26 ENCOUNTER — OFFICE VISIT (OUTPATIENT)
Dept: OBSTETRICS AND GYNECOLOGY | Facility: CLINIC | Age: 25
End: 2025-06-26
Payer: COMMERCIAL

## 2025-06-26 ENCOUNTER — HOSPITAL ENCOUNTER (OUTPATIENT)
Dept: ULTRASOUND IMAGING | Facility: HOSPITAL | Age: 25
Discharge: HOME | End: 2025-06-26
Admitting: OBSTETRICS & GYNECOLOGY
Payer: COMMERCIAL

## 2025-06-26 VITALS
OXYGEN SATURATION: 99 % | WEIGHT: 165 LBS | HEART RATE: 86 BPM | BODY MASS INDEX: 32.22 KG/M2 | DIASTOLIC BLOOD PRESSURE: 72 MMHG | SYSTOLIC BLOOD PRESSURE: 111 MMHG | TEMPERATURE: 98.3 F

## 2025-06-26 DIAGNOSIS — O36.5990 FETAL GROWTH RESTRICTION ANTEPARTUM: Primary | ICD-10-CM

## 2025-06-26 DIAGNOSIS — O35.EXX0 FETAL RENAL ANOMALY, SINGLE GESTATION: ICD-10-CM

## 2025-06-26 PROCEDURE — 76816 OB US FOLLOW-UP PER FETUS: CPT

## 2025-06-26 PROCEDURE — 76819 FETAL BIOPHYS PROFIL W/O NST: CPT

## 2025-06-26 PROCEDURE — 76820 UMBILICAL ARTERY ECHO: CPT

## 2025-06-26 NOTE — LETTER
2025     Yanna Devries MD  [address]  [facility] [address]    Patient: Yanna Johnson   YOB: 2000   Date of Visit: 2025       Dear Yanna Devries MD    Yanna Johnson was in my office today. Below is a copy of my note.    If you have questions, please do not hesitate to call me. I look forward to following Yanna along with you.         Sincerely,        Gwen Bauman MD    MATERNAL FETAL MEDICINE CONSULT NOTE      Dear Dr Yanna Devries MD:    Thank you for your kind referral of Yanna Johnson.  As you know, she is a 24 y.o.   30w4d gestation (Estimated Date of Delivery: 25). This is a consult.     HER ANTEPARTUM COURSE IS COMPLICATED BY:  Right Pelvic Kidney   Fetal Growth Restriction, Normal Dopplers--resolved today possibly    ANEUPLOIDY SCREENING: Declined  CARRIER SCREENING: Declined    HPI: Today, denies contractions, LOF, vaginal bleeding.   She denies headache, vision changes, shortness of breath, acute changes in edema, and RUQ pain. Doing well.  Good movement.      REVIEW OF HISTORY  Past Medical History:   Diagnosis Date   • Anemia      No past surgical history on file.    OB History    Para Term  AB Living   1 0 0 0 0 0   SAB IAB Ectopic Molar Multiple Live Births   0 0 0 0 0 0      # Outcome Date GA Lbr Florian/2nd Weight Sex Type Anes PTL Lv   1 Current              Social History     Socioeconomic History   • Marital status: Single   Tobacco Use   • Smoking status: Former     Types: Cigarettes   • Smokeless tobacco: Never   Vaping Use   • Vaping status: Never Used   Substance and Sexual Activity   • Alcohol use: Not Currently   • Drug use: Not Currently     Types: Marijuana     No family history on file.   No Known Allergies   Current Outpatient Medications on File Prior to Visit   Medication Sig Dispense Refill   • prenatal vitamin (prenatal, CLASSIC, vitamin) tablet Take  by mouth Daily. (Patient not taking: Reported on 2025)       No  current facility-administered medications on file prior to visit.        Past obstetric, gynecological, medical, surgical, family and social history reviewed.  Relevant lab work and imaging reviewed.    REVIEW OF SYSTEMS  As above in HPI    Vitals:    06/26/25 1010   BP: 111/72   BP Location: Right arm   Patient Position: Sitting   Pulse: 86   Temp: 98.3 °F (36.8 °C)   TempSrc: Temporal   SpO2: 99%   Weight: 74.8 kg (165 lb)       Wt Readings from Last 3 Encounters:   06/26/25 74.8 kg (165 lb)   06/10/25 71.9 kg (158 lb 7 oz)   04/29/25 69.4 kg (153 lb)     BP Readings from Last 3 Encounters:   06/26/25 111/72   06/10/25 126/67   04/29/25 109/61     Pulse Readings from Last 3 Encounters:   06/26/25 86   06/10/25 86   04/29/25 87     Pregnancy Episode    PHYSICAL EXAM   General: No acute distress, pleasant, AAO x 3  Respiratory: No increased work of breathing, speaking in complete sentences without difficulty, symmetric chest rise  Cardiac: Regular rate   abdominal: Gravid, nontender  Extremities: No significant edema  Neuro/psych: Awake, Alert and oriented, appropriate mood/affect    ULTRASOUND   A full ultrasound report can be found under imaging tab once finalized and signed off by reading physician  Cephalic presentation.  Posterior placenta.  AGUSTIN 19 cm, which is normal.   EFW 1502 g (23%, AC 22%)  BPP 8/8.   UA dopplers intermittently elevated (3/6 elevated).  No absent or reversed end diastolic flow noted.   Rt kidney appears midline/pelvic.  No urinary tract dilation seen.       ASSESSMENT AND PLAN  Diagnoses and all orders for this visit:    1. Fetal growth restriction antepartum (Primary)    2. Fetal renal anomaly, single gestation           RIGHT PELVIC KIDNEY  Previously Counseled.  Right pelvic kidney today--appears more midabdomen than pelvic.   AGUSTIN is 14 cm, which is normal - the normal amniotic fluid suggests normal kidney function.   Declined NIPT  Peds Uro Referral ordered  Re-evaluate fetal kidneys  at 34 weeks    FETAL GROWTH RESTRICTION, NORMAL UMBILICAL ARTERY DOPPLERS--POSSIBLY RESOLVED  Previously Counseled.   -Diagnosed based on AC 9%  -Declined NIPT, due to cost  -Growth normal today--elevated UA dopplers--will plan to repeat growth in 2 weeks short interval and do weekly dopplers until then.  If dopplers mostly normal and next growth reassuring could de-escalate monitoring.          SUMMARY OF PLAN  -Referral to peds urology - scheduled 25  -Toxo/CMV IgG and IgM being drawn today at primary OB office  -Pt declines NIPT  -Short interval growth ultrasound in 2 weeks (MFM)  -Serial growth ultrasound every 3-4 weeks after short interval (MFM)  -Twice Weekly fetal  surveillance until delivery (Split between MFM (Thursday) and primary OB (Monday))   --MFM will do BPP/UAD   --Ideally if possible, schedule about 2 - 3 days between weekly MFM and OB assessments.   -Growth normal today--elevated UA dopplers--will plan to repeat growth in 2 weeks short interval and do weekly dopplers until then.  If dopplers mostly normal and next growth reassuring could de-escalate monitoring.    -Continue routine prenatal care with primary ob team   -At this time, delivery TBD pending next growth    Follow-up: Weekly    Future Appointments   Date Time Provider Department Center   7/3/2025  9:00 AM [facility] US 1 [facility]   7/3/2025  9:30 AM Gwen Bauman MD [facility]   7/10/2025  9:00 AM [facility] US 1 [facility] US [facility]   7/10/2025  9:30 AM Arline Mcintyre MD [facility]   2025  9:00 AM [facility] US 1 [facility] US [facility]   2025  9:30 AM Jonathan Calderon MD [facility]   2025  9:00 AM [facility] US 1 [facility] US [facility]   2025  9:30 AM Arline Mcintyre MD [facility]   2025  9:00 AM [facility] US 1 BH [facility] US [facility]   2025  9:30 AM Gwen Bauman MD [facility]   2025  9:00 AM [facility] US 1  [facility] US [facility]   2025  9:30 AM Malgorzata Seals APRN [facility]    8/14/2025  9:00 AM [facility] US 1 BH [facility] US [facility]   8/14/2025  9:30 AM Malgorzata Seals APRN [facility]   8/21/2025  9:00 AM [facility] US 1 BH [facility] US [facility]   8/21/2025  9:30 AM Malgorzata Seals APRN [facility]   8/28/2025  9:00 AM [facility] US 1 BH [facility] US [facility]   8/28/2025  9:30 AM Gwen Bauman MD [facility]     Thank you for the consult and opportunity to care for this patient.  Please feel free to reach out with any questions or concerns.      I confirm that all copied/forwarded documentation in this record has been carefully reviewed, updated as necessary, and accurately reflects the patient's current status and plan of care.       I spent 20 minutes caring for this patient on this date of service. This time includes time spent by me in the following activities: preparing for the visit, reviewing tests, obtaining and/or reviewing a separately obtained history, performing a medically appropriate examination and/or evaluation, counseling and educating the patient/family/caregiver and independently interpreting results and communicating that information with the patient/family/caregiver with greater than 50% spent in counseling and coordination of care.     4 minutes reading US.     Gwen Bauman MD FACOG  Maternal Fetal Medicine-[facility]  Office: [phone]  [email or web address]

## 2025-06-26 NOTE — PROGRESS NOTES
MATERNAL FETAL MEDICINE CONSULT NOTE      Dear Dr Yanna Devries MD:    Thank you for your kind referral of Yanna Johnson.  As you know, she is a 24 y.o.   30w4d gestation (Estimated Date of Delivery: 25). This is a consult.     HER ANTEPARTUM COURSE IS COMPLICATED BY:  Right Pelvic Kidney   Fetal Growth Restriction, Normal Dopplers--resolved today possibly    ANEUPLOIDY SCREENING: Declined  CARRIER SCREENING: Declined    HPI: Today, denies contractions, LOF, vaginal bleeding.   She denies headache, vision changes, shortness of breath, acute changes in edema, and RUQ pain. Doing well.  Good movement.      REVIEW OF HISTORY  Past Medical History:   Diagnosis Date    Anemia      No past surgical history on file.    OB History    Para Term  AB Living   1 0 0 0 0 0   SAB IAB Ectopic Molar Multiple Live Births   0 0 0 0 0 0      # Outcome Date GA Lbr Florian/2nd Weight Sex Type Anes PTL Lv   1 Current              Social History     Socioeconomic History    Marital status: Single   Tobacco Use    Smoking status: Former     Types: Cigarettes    Smokeless tobacco: Never   Vaping Use    Vaping status: Never Used   Substance and Sexual Activity    Alcohol use: Not Currently    Drug use: Not Currently     Types: Marijuana     No family history on file.   No Known Allergies   Current Outpatient Medications on File Prior to Visit   Medication Sig Dispense Refill    prenatal vitamin (prenatal, CLASSIC, vitamin) tablet Take  by mouth Daily. (Patient not taking: Reported on 2025)       No current facility-administered medications on file prior to visit.        Past obstetric, gynecological, medical, surgical, family and social history reviewed.  Relevant lab work and imaging reviewed.    REVIEW OF SYSTEMS  As above in HPI    Vitals:    25 1010   BP: 111/72   BP Location: Right arm   Patient Position: Sitting   Pulse: 86   Temp: 98.3 °F (36.8 °C)   TempSrc: Temporal   SpO2: 99%   Weight: 74.8 kg (165 lb)        Wt Readings from Last 3 Encounters:   06/26/25 74.8 kg (165 lb)   06/10/25 71.9 kg (158 lb 7 oz)   04/29/25 69.4 kg (153 lb)     BP Readings from Last 3 Encounters:   06/26/25 111/72   06/10/25 126/67   04/29/25 109/61     Pulse Readings from Last 3 Encounters:   06/26/25 86   06/10/25 86   04/29/25 87     Pregnancy Episode    PHYSICAL EXAM   General: No acute distress, pleasant, AAO x 3  Respiratory: No increased work of breathing, speaking in complete sentences without difficulty, symmetric chest rise  Cardiac: Regular rate   abdominal: Gravid, nontender  Extremities: No significant edema  Neuro/psych: Awake, Alert and oriented, appropriate mood/affect    ULTRASOUND   A full ultrasound report can be found under imaging tab once finalized and signed off by reading physician  Cephalic presentation.  Posterior placenta.  AGUSTIN 19 cm, which is normal.   EFW 1502 g (23%, AC 22%)  BPP 8/8.   UA dopplers intermittently elevated (3/6 elevated).  No absent or reversed end diastolic flow noted.   Rt kidney appears midline/pelvic.  No urinary tract dilation seen.       ASSESSMENT AND PLAN  Diagnoses and all orders for this visit:    1. Fetal growth restriction antepartum (Primary)    2. Fetal renal anomaly, single gestation           RIGHT PELVIC KIDNEY  Previously Counseled.  Right pelvic kidney today--appears more midabdomen than pelvic.   AGUSTIN is 14 cm, which is normal - the normal amniotic fluid suggests normal kidney function.   Declined NIPT  Peds Uro Referral ordered  Re-evaluate fetal kidneys at 34 weeks    FETAL GROWTH RESTRICTION, NORMAL UMBILICAL ARTERY DOPPLERS--POSSIBLY RESOLVED  Previously Counseled.   -Diagnosed based on AC 9%  -Declined NIPT, due to cost  -Growth normal today--elevated UA dopplers--will plan to repeat growth in 2 weeks short interval and do weekly dopplers until then.  If dopplers mostly normal and next growth reassuring could de-escalate monitoring.          SUMMARY OF PLAN  -Referral to  peds urology - scheduled 25  -Toxo/CMV IgG and IgM being drawn today at primary OB office  -Pt declines NIPT  -Short interval growth ultrasound in 2 weeks (MFM)  -Serial growth ultrasound every 3-4 weeks after short interval (MFM)  -Twice Weekly fetal  surveillance until delivery (Split between MFM (Thursday) and primary OB (Monday))   --MFM will do BPP/UAD   --Ideally if possible, schedule about 2 - 3 days between weekly MFM and OB assessments.   -Growth normal today--elevated UA dopplers--will plan to repeat growth in 2 weeks short interval and do weekly dopplers until then.  If dopplers mostly normal and next growth reassuring could de-escalate monitoring.    -Continue routine prenatal care with primary ob team   -At this time, delivery TBD pending next growth    Follow-up: Weekly    Future Appointments   Date Time Provider Department Center   7/3/2025  9:00 AM [facility] US 1 [facility] US [facility]   7/3/2025  9:30 AM Gwen Bauman MD [facility]   7/10/2025  9:00 AM [facility] US 1 BH [facility] US [facility]   7/10/2025  9:30 AM Arline Mcintyre MD [facility]   2025  9:00 AM [facility] US 1 BH [facility] US [facility]   2025  9:30 AM Jonathan Calderon MD [facility]   2025  9:00 AM [facility] US 1 BH [facility] US [facility]   2025  9:30 AM Arline Mcintyre MD [facility]   2025  9:00 AM [facility] US 1 BH [facility] US [facility]   2025  9:30 AM Gwen Bauman MD [facility]   2025  9:00 AM [facility] US 1 BH [facility] US [facility]   2025  9:30 AM Malgorzata Seals APRN [facility]   2025  9:00 AM [facility] US 1 BH [facility] US [facility]   2025  9:30 AM Malgorzata Seals APRN [facility]   2025  9:00 AM [facility] US 1 BH [facility] US [facility]   2025  9:30 AM Malgorzata Seals APRN [facility]   2025  9:00 AM [facility] US 1 BH [facility] US [facility]   2025  9:30 AM Gwen Bauman MD [facility]     Thank you for the consult and opportunity to care for this patient.  Please feel free to  reach out with any questions or concerns.      I confirm that all copied/forwarded documentation in this record has been carefully reviewed, updated as necessary, and accurately reflects the patient's current status and plan of care.       I spent 20 minutes caring for this patient on this date of service. This time includes time spent by me in the following activities: preparing for the visit, reviewing tests, obtaining and/or reviewing a separately obtained history, performing a medically appropriate examination and/or evaluation, counseling and educating the patient/family/caregiver and independently interpreting results and communicating that information with the patient/family/caregiver with greater than 50% spent in counseling and coordination of care.     4 minutes reading US.     Gwen Bauman MD [facility]  Maternal Fetal Medicine-Psych[facility]  Office: [phone]  [email or web address]

## 2025-06-26 NOTE — PROGRESS NOTES
Pt reports that she is doing well and denies vaginal bleeding or LOF at this time. Notes irregular [place] ctxs, denies consistency. Reports active fetal movement. Reviewed when to call OB office or present to L&D for evaluation with symptoms such as decreased fetal movement, vaginal bleeding, LOF or ctxs. Pt verbalized understanding. Denies HA, visual changes or epigastric pain. Denies any additional complaints at time of appointment. Next OB appointment scheduled for early next week.     Vitals:    06/26/25 1010   BP: 111/72   Pulse: 86   Temp: 98.3 °F (36.8 °C)   SpO2: 99%

## 2025-07-03 ENCOUNTER — TELEPHONE (OUTPATIENT)
Dept: ULTRASOUND IMAGING | Facility: HOSPITAL | Age: 25
End: 2025-07-03
Payer: COMMERCIAL

## 2025-07-10 ENCOUNTER — OFFICE VISIT (OUTPATIENT)
Dept: OBSTETRICS AND GYNECOLOGY | Facility: CLINIC | Age: 25
End: 2025-07-10
Payer: COMMERCIAL

## 2025-07-10 ENCOUNTER — HOSPITAL ENCOUNTER (OUTPATIENT)
Dept: ULTRASOUND IMAGING | Facility: HOSPITAL | Age: 25
Discharge: HOME | End: 2025-07-10
Admitting: OBSTETRICS & GYNECOLOGY
Payer: COMMERCIAL

## 2025-07-10 VITALS
TEMPERATURE: 97.1 F | DIASTOLIC BLOOD PRESSURE: 71 MMHG | BODY MASS INDEX: 33.83 KG/M2 | WEIGHT: 172.3 LBS | SYSTOLIC BLOOD PRESSURE: 110 MMHG | OXYGEN SATURATION: 98 % | HEART RATE: 88 BPM | HEIGHT: 60 IN

## 2025-07-10 DIAGNOSIS — O35.EXX0 FETAL RENAL ANOMALY, SINGLE GESTATION: ICD-10-CM

## 2025-07-10 DIAGNOSIS — O36.5990 FETAL GROWTH RESTRICTION ANTEPARTUM: Primary | ICD-10-CM

## 2025-07-10 DIAGNOSIS — Z3A.32 32 WEEKS GESTATION OF PREGNANCY: ICD-10-CM

## 2025-07-10 PROCEDURE — 76820 UMBILICAL ARTERY ECHO: CPT

## 2025-07-10 PROCEDURE — 76819 FETAL BIOPHYS PROFIL W/O NST: CPT

## 2025-07-10 PROCEDURE — 76816 OB US FOLLOW-UP PER FETUS: CPT

## 2025-07-10 NOTE — LETTER
July 10, 2025     Yanna Devries MD  [address]  [facility] [address]  [facility] [address]    Patient: Yanna Johnson   YOB: 2000   Date of Visit: 7/10/2025       Dear Yanna Devries MD    Yanna Johnson was in my office today. Below is a copy of my note.    If you have questions, please do not hesitate to call me. I look forward to following Yanna along with you.         Sincerely,        Arline Mcintyre MD        CC: No Recipients    MATERNAL FETAL MEDICINE CONSULT NOTE      Dear Dr Yanna Devries MD:    Thank you for your kind referral of Yanna Johnson.  As you know, she is a 24 y.o.   32w4d gestation (Estimated Date of Delivery: 25). This is a consult.     HER ANTEPARTUM COURSE IS COMPLICATED BY:  Right Pelvic Kidney   Fetal Growth Restriction, Normal Dopplers--resolved today possibly    ANEUPLOIDY SCREENING: Declined  CARRIER SCREENING: Declined    HPI: Today, denies contractions, LOF, vaginal bleeding.   She denies headache, vision changes, shortness of breath, acute changes in edema, and RUQ pain. Doing well.  Good movement.      REVIEW OF HISTORY  Past Medical History:   Diagnosis Date   • Anemia      No past surgical history on file.    OB History    Para Term  AB Living   1 0 0 0 0 0   SAB IAB Ectopic Molar Multiple Live Births   0 0 0 0 0 0      # Outcome Date GA Lbr Florian/2nd Weight Sex Type Anes PTL Lv   1 Current              Social History     Socioeconomic History   • Marital status: Single   Tobacco Use   • Smoking status: Former     Types: Cigarettes   • Smokeless tobacco: Never   Vaping Use   • Vaping status: Never Used   Substance and Sexual Activity   • Alcohol use: Not Currently   • Drug use: Not Currently     Types: Marijuana     No family history on file.   No Known Allergies   Current Outpatient Medications on File Prior to Visit   Medication Sig Dispense Refill   • prenatal vitamin (prenatal, CLASSIC, vitamin) tablet Take  by mouth Daily.       No current  "facility-administered medications on file prior to visit.        Past obstetric, gynecological, medical, surgical, family and social history reviewed.  Relevant lab work and imaging reviewed.    REVIEW OF SYSTEMS  As above in HPI    Vitals:    07/10/25 0945   BP: 110/71   BP Location: Left arm   Patient Position: Sitting   Pulse: 88   Temp: 97.1 °F (36.2 °C)   TempSrc: Temporal   SpO2: 98%   Weight: 78.2 kg (172 lb 4.8 oz)   Height: 152.4 cm (60\")         Wt Readings from Last 3 Encounters:   07/10/25 78.2 kg (172 lb 4.8 oz)   06/26/25 74.8 kg (165 lb)   06/10/25 71.9 kg (158 lb 7 oz)     BP Readings from Last 3 Encounters:   07/10/25 110/71   06/26/25 111/72   06/10/25 126/67     Pulse Readings from Last 3 Encounters:   07/10/25 88   06/26/25 86   06/10/25 86     Pregnancy Episode    PHYSICAL EXAM   General: No acute distress  Respiratory: No increased work of breathing  Cardiac: reg rate   Abdominal: Gravid, nontender  Extremities: No significant edema  Neuro/psych: Alert and oriented, appropriate mood      ULTRASOUND   A full ultrasound report can be found under imaging tab once finalized and signed off by reading physician  Breech presentation  Posterior placenta  AGUSTIN 11 cm which is normal  Fetal biometry is consistent with fetal growth restriction EFW 17%, AC 9%  Umbilical artery Dopplers are overall normal. 2 out of 6 waveforms are elevated. No evidence of absent or reversed end-diastolic flow  BPP 8/8      ASSESSMENT AND PLAN  Diagnoses and all orders for this visit:    1. Fetal growth restriction antepartum (Primary)    2. Fetal renal anomaly, single gestation    3. 32 weeks gestation of pregnancy             RIGHT PELVIC KIDNEY  Previously Counseled.  Right pelvic kidney today--appears more midabdomen than pelvic.   AGUSTIN is 14 cm, which is normal - the normal amniotic fluid suggests normal kidney function.   Declined NIPT  Peds Uro Referral ordered  Re-evaluate fetal kidneys at 34 weeks    Has appointment " scheduled with pediatric urology.     FETAL GROWTH RESTRICTION, NORMAL UMBILICAL ARTERY DOPPLERS--POSSIBLY RESOLVED  Previously Counseled.   -Diagnosed based on AC 9%  -Declined NIPT, due to cost  -Today: a short interval growth was completed and was consistent with FGR by AC 9%. Overall dopplers were normal today. We will continue twice weekly monitoring alternating between MFM and primary OB. UAD will be completed in MFM office.         SUMMARY OF PLAN  -Referral to peds urology - scheduled 25  -Toxo/CMV IgG and IgM being drawn today at primary OB office  -Pt declines NIPT  -Serial growth ultrasound every 3-4 weeks after short interval (MFM)  -Twice Weekly fetal  surveillance until delivery (Split between MFM (Thursday) and primary OB (Monday))   --MFM will do BPP/UAD   --Ideally if possible, schedule about 2 - 3 days between weekly MFM and OB assessments.   -Continue routine prenatal care with primary ob team   -At this time, delivery 38-39 WEEKS     Follow-up: Weekly    Future Appointments   Date Time Provider Department Center   2025  9:00 AM [facility] US 1 [facility]   2025  9:30 AM Jonathan Calderon MD [facility]   2025  9:00 AM [facility] US 1 BH [facility] US [facility]   2025  9:30 AM Arline Mcintyre MD [facility]   2025  9:00 AM [facility] US 1 BH [facility] US [facility]   2025  9:30 AM Gwen Bauman MD [facility]   2025  9:00 AM [facility] US 1 BH [facility] US [facility]   2025  9:30 AM Malgorzata Seals APRN [facility]   2025  9:00 AM [facility] US 1 BH [facility] US [facility]   2025  9:30 AM Malgorzata Seals APRN [facility]   2025  9:00 AM [facility] US 1 BH [facility] US [facility]   2025  9:30 AM Malgorzata Seals APRN [facility]   2025  9:00 AM [facility] US 1 BH [facility] US [facility]   2025  9:30 AM Gwen Bauman MD [facility]     Thank you for the consult and opportunity to care for this patient.  Please feel free to reach out with any  questions or concerns.      I confirm that all copied/forwarded documentation in this record has been carefully reviewed, updated as necessary, and accurately reflects the patient's current status and plan of care.     I spent 15 minutes caring for this patient on this date of service. This time includes time spent by me in the following activities: preparing for the visit, reviewing tests, obtaining and/or reviewing a separately obtained history, performing a medically appropriate examination and/or evaluation, counseling and educating the patient/family/caregiver and independently interpreting results and communicating that information with the patient/family/caregiver with greater than 50% spent in counseling and coordination of care.         I spent 4 minutes on the separately reported service of US imaging not included in the time used to support the E/M service also reported today.      Arline Mcintyre MD   Maternal Fetal Medicine-[facility]  Office: [phone]      Pt reports that she is doing well and denies vaginal bleeding, cramping, contractions or LOF at this time. Reports active fetal movement. Reviewed when to call OB office or present to L&D for evaluation with symptoms such as decreased fetal movement, vaginal bleeding, LOF or ctxs. Pt verbalized understanding. Denies HA, visual changes or epigastric pain. Denies any additional complaints at time of appointment. Next OB appointment scheduled for 7/21.     Vitals:    07/10/25 0945   BP: 110/71   Pulse: 88   Temp: 97.1 °F (36.2 °C)   SpO2: 98%

## 2025-07-10 NOTE — PROGRESS NOTES
"MATERNAL FETAL MEDICINE CONSULT NOTE      Dear Dr Yanna Devries MD:    Thank you for your kind referral of Yanna Johnson.  As you know, she is a 24 y.o.   32w4d gestation (Estimated Date of Delivery: 25). This is a consult.     HER ANTEPARTUM COURSE IS COMPLICATED BY:  Right Pelvic Kidney   Fetal Growth Restriction, Normal Dopplers--resolved today possibly    ANEUPLOIDY SCREENING: Declined  CARRIER SCREENING: Declined    HPI: Today, denies contractions, LOF, vaginal bleeding.   She denies headache, vision changes, shortness of breath, acute changes in edema, and RUQ pain. Doing well.  Good movement.      REVIEW OF HISTORY  Past Medical History:   Diagnosis Date    Anemia      No past surgical history on file.    OB History    Para Term  AB Living   1 0 0 0 0 0   SAB IAB Ectopic Molar Multiple Live Births   0 0 0 0 0 0      # Outcome Date GA Lbr Florian/2nd Weight Sex Type Anes PTL Lv   1 Current              Social History     Socioeconomic History    Marital status: Single   Tobacco Use    Smoking status: Former     Types: Cigarettes    Smokeless tobacco: Never   Vaping Use    Vaping status: Never Used   Substance and Sexual Activity    Alcohol use: Not Currently    Drug use: Not Currently     Types: Marijuana     No family history on file.   No Known Allergies   Current Outpatient Medications on File Prior to Visit   Medication Sig Dispense Refill    prenatal vitamin (prenatal, CLASSIC, vitamin) tablet Take  by mouth Daily.       No current facility-administered medications on file prior to visit.        Past obstetric, gynecological, medical, surgical, family and social history reviewed.  Relevant lab work and imaging reviewed.    REVIEW OF SYSTEMS  As above in HPI    Vitals:    07/10/25 0945   BP: 110/71   BP Location: Left arm   Patient Position: Sitting   Pulse: 88   Temp: 97.1 °F (36.2 °C)   TempSrc: Temporal   SpO2: 98%   Weight: 78.2 kg (172 lb 4.8 oz)   Height: 152.4 cm (60\")         Wt " Readings from Last 3 Encounters:   07/10/25 78.2 kg (172 lb 4.8 oz)   06/26/25 74.8 kg (165 lb)   06/10/25 71.9 kg (158 lb 7 oz)     BP Readings from Last 3 Encounters:   07/10/25 110/71   06/26/25 111/72   06/10/25 126/67     Pulse Readings from Last 3 Encounters:   07/10/25 88   06/26/25 86   06/10/25 86     Pregnancy Episode    PHYSICAL EXAM   General: No acute distress  Respiratory: No increased work of breathing  Cardiac: reg rate   Abdominal: Gravid, nontender  Extremities: No significant edema  Neuro/psych: Alert and oriented, appropriate mood      ULTRASOUND   A full ultrasound report can be found under imaging tab once finalized and signed off by reading physician  Breech presentation  Posterior placenta  AGUSTIN 11 cm which is normal  Fetal biometry is consistent with fetal growth restriction EFW 17%, AC 9%  Umbilical artery Dopplers are overall normal. 2 out of 6 waveforms are elevated. No evidence of absent or reversed end-diastolic flow  BPP 8/8      ASSESSMENT AND PLAN  Diagnoses and all orders for this visit:    1. Fetal growth restriction antepartum (Primary)    2. Fetal renal anomaly, single gestation    3. 32 weeks gestation of pregnancy             RIGHT PELVIC KIDNEY  Previously Counseled.  Right pelvic kidney today--appears more midabdomen than pelvic.   AGUSTIN is 14 cm, which is normal - the normal amniotic fluid suggests normal kidney function.   Declined NIPT  Peds Uro Referral ordered  Re-evaluate fetal kidneys at 34 weeks    Has appointment scheduled with pediatric urology.     FETAL GROWTH RESTRICTION, NORMAL UMBILICAL ARTERY DOPPLERS--POSSIBLY RESOLVED  Previously Counseled.   -Diagnosed based on AC 9%  -Declined NIPT, due to cost  -Today: a short interval growth was completed and was consistent with FGR by AC 9%. Overall dopplers were normal today. We will continue twice weekly monitoring alternating between M and primary OB. UAD will be completed in M office.         SUMMARY OF  PLAN  -Referral to [facility] - scheduled 25  -Toxo/CMV IgG and IgM being drawn today at primary OB office  -Pt declines NIPT  -Serial growth ultrasound every 3-4 weeks after short interval (MFM)  -Twice Weekly fetal  surveillance until delivery (Split between MFM (Thursday) and primary OB (Monday))   --MFM will do BPP/UAD   --Ideally if possible, schedule about 2 - 3 days between weekly MFM and OB assessments.   -Continue routine prenatal care with primary ob team   -At this time, delivery 38-39 WEEKS     Follow-up: Weekly    Future Appointments   Date Time Provider Department Center   2025  9:00 AM [facility] 1 [facility]   2025  9:30 AM Jonathan Calderon MD [facility]   2025  9:00 AM [facility] 1 [facility]   2025  9:30 AM Arline Mcintyre MD [facility]   2025  9:00 AM [facility] 1 [facility]   2025  9:30 AM Gwen Bauman MD [facility]   2025  9:00 AM [facility] 1 [facility]   2025  9:30 AM Malgorzata Seals APRN [facility]   2025  9:00 AM [facility] US 1 [facility]   2025  9:30 AM Malgorzata Seals APRN [facility]   2025  9:00 AM [facility] US 1 [facility]   2025  9:30 AM Malgorzata Seals APRN [facility]   2025  9:00 AM [facility] US 1 [facility]   2025  9:30 AM Gwen Bauman MD [facility]     Thank you for the consult and opportunity to care for this patient.  Please feel free to reach out with any questions or concerns.      I confirm that all copied/forwarded documentation in this record has been carefully reviewed, updated as necessary, and accurately reflects the patient's current status and plan of care.     I spent 15 minutes caring for this patient on this date of service. This time includes time spent by me in the following activities: preparing for the visit, reviewing tests, obtaining and/or reviewing a separately obtained history,  performing a medically appropriate examination and/or evaluation, counseling and educating the patient/family/caregiver and independently interpreting results and communicating that information with the patient/family/caregiver with greater than 50% spent in counseling and coordination of care.         I spent 4 minutes on the separately reported service of US imaging not included in the time used to support the E/M service also reported today.      Arline Mcintyre MD   Maternal Fetal Medicine-[facility]  Office: [phone]

## 2025-07-10 NOTE — PROGRESS NOTES
Pt reports that she is doing well and denies vaginal bleeding, cramping, contractions or LOF at this time. Reports active fetal movement. Reviewed when to call OB office or present to L&D for evaluation with symptoms such as decreased fetal movement, vaginal bleeding, LOF or ctxs. Pt verbalized understanding. Denies HA, visual changes or epigastric pain. Denies any additional complaints at time of appointment. Next OB appointment scheduled for 7/21.     Vitals:    07/10/25 0945   BP: 110/71   Pulse: 88   Temp: 97.1 °F (36.2 °C)   SpO2: 98%

## 2025-07-14 PROBLEM — O09.40 ENCOUNTER FOR SUPERVISION OF HIGH RISK PREGNANCY WITH GRAND MULTIPARITY, ANTEPARTUM: Status: ACTIVE | Noted: 2025-07-14

## 2025-07-14 PROBLEM — O36.5990 FETAL GROWTH RESTRICTION ANTEPARTUM: Status: ACTIVE | Noted: 2025-07-14

## 2025-07-14 PROBLEM — O35.EXX0 FETAL RENAL ANOMALY, SINGLE GESTATION: Status: ACTIVE | Noted: 2025-07-14

## 2025-07-14 NOTE — PROGRESS NOTES
"MATERNAL FETAL MEDICINE CONSULT NOTE      Dear Dr Yanna Devries MD:    Thank you for your kind referral of Yanna Johnson.  As you know, she is a 24 y.o.   33w4d gestation (Estimated Date of Delivery: 25). This is a consult.     HER ANTEPARTUM COURSE IS COMPLICATED BY:  Right Pelvic Kidney   Fetal Growth Restriction    ANEUPLOIDY SCREENING: Declined  CARRIER SCREENING: Declined    HPI: Today, denies contractions, LOF, vaginal bleeding. Good movement.      REVIEW OF HISTORY  Past Medical History:   Diagnosis Date    Anemia      No past surgical history on file.    OB History    Para Term  AB Living   1 0 0 0 0 0   SAB IAB Ectopic Molar Multiple Live Births   0 0 0 0 0 0      # Outcome Date GA Lbr Florian/2nd Weight Sex Type Anes PTL Lv   1 Current              Social History     Socioeconomic History    Marital status: Single   Tobacco Use    Smoking status: Former     Types: Cigarettes    Smokeless tobacco: Never   Vaping Use    Vaping status: Never Used   Substance and Sexual Activity    Alcohol use: Not Currently    Drug use: Not Currently     Types: Marijuana     No family history on file.   No Known Allergies   Current Outpatient Medications on File Prior to Visit   Medication Sig Dispense Refill    prenatal vitamin (prenatal, CLASSIC, vitamin) tablet Take  by mouth Daily.       No current facility-administered medications on file prior to visit.        Past obstetric, gynecological, medical, surgical, family and social history reviewed.  Relevant lab work and imaging reviewed.    REVIEW OF SYSTEMS  As above in HPI    Vitals:    25 0936   BP: 114/75   BP Location: Left arm   Patient Position: Sitting   Pulse: 86   Temp: 98.1 °F (36.7 °C)   TempSrc: Oral   SpO2: 100%   Weight: 79.7 kg (175 lb 12.8 oz)   Height: 152.4 cm (60\")           Wt Readings from Last 3 Encounters:   25 79.7 kg (175 lb 12.8 oz)   07/10/25 78.2 kg (172 lb 4.8 oz)   25 74.8 kg (165 lb)     BP Readings from " Last 3 Encounters:   25 114/75   07/10/25 110/71   25 111/72     Pulse Readings from Last 3 Encounters:   25 86   07/10/25 88   25 86     Pregnancy Episode    PHYSICAL EXAM   General: No acute distress  Neuro/psych: Alert and oriented, appropriate mood      ULTRASOUND   A full ultrasound report can be found under imaging tab once finalized and signed off by reading physician   GROWTH: Fetal biometry was consistent with fetal growth restriction EFW 1813 grams, 17%, AC 9%  Cephalic, 145 bpm  Posterior placenta  AGUSTIN 22  BPP 8/8.   UA dopp PI 1.06 (82%)    ASSESSMENT AND PLAN  Diagnoses and all orders for this visit:    1. Fetal renal anomaly, single gestation (Primary)    2. Fetal growth restriction antepartum    3. Encounter for supervision of high risk pregnancy with grand multiparity, antepartum      RIGHT PELVIC KIDNEY- stable  Previously Counseled.  Right pelvic kidney today--appears more midabdomen than pelvic.   AGUSTIN is 14 cm, which is normal - the normal amniotic fluid suggests normal kidney function.   Declined NIPT  Peds Uro Referral ordered  Re-evaluate fetal kidneys at 34 weeks    Has appointment scheduled with pediatric urology.     FETAL GROWTH RESTRICTION, NORMAL UMBILICAL ARTERY DOPPLERS--POSSIBLY RESOLVED  Previously Counseled.   -Diagnosed based on AC 9%  -Declined NIPT, due to cost  -Today: the BPP and dopplers are reassuring.  Dr. Burger recommended twice weekly monitoring alternating between MFM and primary OB. UAD will be completed in MFM office.     SUMMARY OF PLAN  -Referral to peds urology - scheduled 25  -Toxo/CMV IgG and IgM being drawn today at primary OB office  -Pt declines NIPT  -Serial growth ultrasound every 3-4 weeks after short interval (MFM)  -Twice Weekly fetal  surveillance until delivery (Split between MFM (Thursday) and primary OB (Monday))   --MFM will do BPP/UAD   --Ideally if possible, schedule about 2 - 3 days between weekly MFM  and OB assessments.   -Continue routine prenatal care with primary ob team   -At this time, delivery 38-39 WEEKS     NOTE:   - Previous consult note stated that Toxoplasma and CMV studies were to be drawn at OB office on July 10th. However, there are no results  - It would be reasonable to include the labs with the patient's routine late third trimester testing.     Follow-up: Weekly    Future Appointments   Date Time Provider Department Center   7/24/2025  9:00 AM [facility]   7/24/2025  9:30 AM Arline Mcintyre MD [facility]   7/31/2025  9:00 AM [facility] US 1 [facility]   7/31/2025  9:30 AM Gwen Bauman MD [facility]   8/7/2025  9:00 AM [facility] US 1 [facility]   8/7/2025  9:30 AM Malgorzata Seals APRN [facility]   8/14/2025  9:00 AM [facility] US 1 [facility]   8/14/2025  9:30 AM Malgorzata Seals APRN [facility]   8/21/2025  9:00 AM [facility] US 1 [facility]   8/21/2025  9:30 AM Malgorzata Seals APRN [facility]   8/28/2025  9:00 AM [facility] US 1 [facility]   8/28/2025  9:30 AM Gwen Bauman MD [facility]     Thank you for the consult and opportunity to care for this patient.  Please feel free to reach out with any questions or concerns.      I confirm that all copied/forwarded documentation in this record has been carefully reviewed, updated as necessary, and accurately reflects the patient's current status and plan of care.     I spent 20 minutes caring for this patient on this date of service. This time includes time spent by me in the following activities: preparing for the visit, reviewing tests, obtaining and/or reviewing a separately obtained history, performing a medically appropriate examination and/or evaluation, counseling and educating the patient/family/caregiver and independently interpreting results and communicating that information with the patient/family/caregiver with greater than 50% spent in counseling and  coordination of care.         I spent 4 minutes on the separately reported service of US imaging not included in the time used to support the E/M service also reported today.      Jonathan Calderon MD   Maternal Fetal Medicine-[facility]  Office: [phone]

## 2025-07-17 ENCOUNTER — OFFICE VISIT (OUTPATIENT)
Dept: OBSTETRICS AND GYNECOLOGY | Facility: CLINIC | Age: 25
End: 2025-07-17
Payer: COMMERCIAL

## 2025-07-17 ENCOUNTER — HOSPITAL ENCOUNTER (OUTPATIENT)
Dept: ULTRASOUND IMAGING | Facility: HOSPITAL | Age: 25
Discharge: HOME | End: 2025-07-17
Admitting: OBSTETRICS & GYNECOLOGY
Payer: COMMERCIAL

## 2025-07-17 VITALS
OXYGEN SATURATION: 100 % | SYSTOLIC BLOOD PRESSURE: 114 MMHG | DIASTOLIC BLOOD PRESSURE: 75 MMHG | WEIGHT: 175.8 LBS | HEIGHT: 60 IN | TEMPERATURE: 98.1 F | BODY MASS INDEX: 34.51 KG/M2 | HEART RATE: 86 BPM

## 2025-07-17 DIAGNOSIS — O35.EXX0 FETAL RENAL ANOMALY, SINGLE GESTATION: Primary | ICD-10-CM

## 2025-07-17 DIAGNOSIS — O09.40 ENCOUNTER FOR SUPERVISION OF HIGH RISK PREGNANCY WITH GRAND MULTIPARITY, ANTEPARTUM: ICD-10-CM

## 2025-07-17 DIAGNOSIS — O36.5990 FETAL GROWTH RESTRICTION ANTEPARTUM: ICD-10-CM

## 2025-07-17 PROCEDURE — 76820 UMBILICAL ARTERY ECHO: CPT

## 2025-07-17 PROCEDURE — 76819 FETAL BIOPHYS PROFIL W/O NST: CPT

## 2025-07-17 NOTE — PROGRESS NOTES
Pt reports that she is doing well and denies vaginal bleeding, cramping, contractions or LOF at this time. Reports active fetal movement. Reviewed when to call OB office or present to L&D for evaluation with symptoms such as decreased fetal movement, vaginal bleeding, LOF or ctxs. Pt verbalized understanding. Denies HA, visual changes or epigastric pain. Denies any additional complaints at time of appointment. Next OB appointment scheduled for today.       Vitals:    07/17/25 0936   BP: 114/75   Pulse: 86   Temp: 98.1 °F (36.7 °C)   SpO2: 100%

## 2025-07-17 NOTE — LETTER
2025     Yanna Devries MD  [address]  [address]  [place] [address]    Patient: Yanna Johnson   YOB: 2000   Date of Visit: 2025       Dear Yanna Devries MD    Yanna Johnson was in my office today. Below is a copy of my note.    If you have questions, please do not hesitate to call me. I look forward to following Yanna along with you.         Sincerely,        Jonathan Calderon MD        CC: No Recipients    MATERNAL FETAL MEDICINE CONSULT NOTE      Dear Dr Yanna Devries MD:    Thank you for your kind referral of Yanna Johnson.  As you know, she is a 24 y.o.   33w4d gestation (Estimated Date of Delivery: 25). This is a consult.     HER ANTEPARTUM COURSE IS COMPLICATED BY:  Right Pelvic Kidney   Fetal Growth Restriction    ANEUPLOIDY SCREENING: Declined  CARRIER SCREENING: Declined    HPI: Today, denies contractions, LOF, vaginal bleeding. Good movement.      REVIEW OF HISTORY  Past Medical History:   Diagnosis Date   • Anemia      No past surgical history on file.    OB History    Para Term  AB Living   1 0 0 0 0 0   SAB IAB Ectopic Molar Multiple Live Births   0 0 0 0 0 0      # Outcome Date GA Lbr Florian/2nd Weight Sex Type Anes PTL Lv   1 Current              Social History     Socioeconomic History   • Marital status: Single   Tobacco Use   • Smoking status: Former     Types: Cigarettes   • Smokeless tobacco: Never   Vaping Use   • Vaping status: Never Used   Substance and Sexual Activity   • Alcohol use: Not Currently   • Drug use: Not Currently     Types: Marijuana     No family history on file.   No Known Allergies   Current Outpatient Medications on File Prior to Visit   Medication Sig Dispense Refill   • prenatal vitamin (prenatal, CLASSIC, vitamin) tablet Take  by mouth Daily.       No current facility-administered medications on file prior to visit.        Past obstetric, gynecological, medical, surgical, family and social history reviewed.  Relevant lab  "work and imaging reviewed.    REVIEW OF SYSTEMS  As above in HPI    Vitals:    07/17/25 0936   BP: 114/75   BP Location: Left arm   Patient Position: Sitting   Pulse: 86   Temp: 98.1 °F (36.7 °C)   TempSrc: Oral   SpO2: 100%   Weight: 79.7 kg (175 lb 12.8 oz)   Height: 152.4 cm (60\")           Wt Readings from Last 3 Encounters:   07/17/25 79.7 kg (175 lb 12.8 oz)   07/10/25 78.2 kg (172 lb 4.8 oz)   06/26/25 74.8 kg (165 lb)     BP Readings from Last 3 Encounters:   07/17/25 114/75   07/10/25 110/71   06/26/25 111/72     Pulse Readings from Last 3 Encounters:   07/17/25 86   07/10/25 88   06/26/25 86     Pregnancy Episode    PHYSICAL EXAM   General: No acute distress  Neuro/psych: Alert and oriented, appropriate mood      ULTRASOUND   A full ultrasound report can be found under imaging tab once finalized and signed off by reading physician  JULY 10TH GROWTH: Fetal biometry was consistent with fetal growth restriction EFW 1813 grams, 17%, AC 9%  Cephalic, 145 bpm  Posterior placenta  AGUSTIN 22  BPP 8/8.   UA dopp PI 1.06 (82%)    ASSESSMENT AND PLAN  Diagnoses and all orders for this visit:    1. Fetal renal anomaly, single gestation (Primary)    2. Fetal growth restriction antepartum    3. Encounter for supervision of high risk pregnancy with grand multiparity, antepartum      RIGHT PELVIC KIDNEY- stable  Previously Counseled.  Right pelvic kidney today--appears more midabdomen than pelvic.   AGUSTIN is 14 cm, which is normal - the normal amniotic fluid suggests normal kidney function.   Declined NIPT  Peds Uro Referral ordered  Re-evaluate fetal kidneys at 34 weeks    Has appointment scheduled with pediatric urology.     FETAL GROWTH RESTRICTION, NORMAL UMBILICAL ARTERY DOPPLERS--POSSIBLY RESOLVED  Previously Counseled.   -Diagnosed based on AC 9%  -Declined NIPT, due to cost  -Today: the BPP and dopplers are reassuring.  Dr. Burger recommended twice weekly monitoring alternating between MFM and primary OB. UAD will be " completed in MFM office.     SUMMARY OF PLAN  -Referral to [facility] urology - scheduled 25  -Toxo/CMV IgG and IgM being drawn today at primary OB office  -Pt declines NIPT  -Serial growth ultrasound every 3-4 weeks after short interval (MFM)  -Twice Weekly fetal  surveillance until delivery (Split between MFM (Thursday) and primary OB (Monday))   --MFM will do BPP/UAD   --Ideally if possible, schedule about 2 - 3 days between weekly MFM and OB assessments.   -Continue routine prenatal care with primary ob team   -At this time, delivery 38-39 WEEKS     NOTE:   - Previous consult note stated that Toxoplasma and CMV studies were to be drawn at OB office on . However, there are no results  - It would be reasonable to include the labs with the patient's routine late third trimester testing.     Follow-up: Weekly    Future Appointments   Date Time Provider Department Center   2025  9:00 AM [facility] US 1 [facility]   2025  9:30 AM Arline Mcintyre MD [facility]   2025  9:00 AM [facility] US 1 [facility]   2025  9:30 AM Gwen Bauman MD [facility]   2025  9:00 AM [facility] US 1 [facility]   2025  9:30 AM Malgorzata Seals APRN [facility]   2025  9:00 AM [facility] US 1 BH [facility]   2025  9:30 AM Malgorzata Seals APRN [facility]   2025  9:00 AM [facility] US 1 [facility]   2025  9:30 AM Malgorzata Seals APRN [facility]   2025  9:00 AM [facility] US 1 BH [facility]   2025  9:30 AM Gwen Bauman MD [facility]     Thank you for the consult and opportunity to care for this patient.  Please feel free to reach out with any questions or concerns.      I confirm that all copied/forwarded documentation in this record has been carefully reviewed, updated as necessary, and accurately reflects the patient's current status and plan of care.     I spent 20 minutes caring for this patient on this date of  service. This time includes time spent by me in the following activities: preparing for the visit, reviewing tests, obtaining and/or reviewing a separately obtained history, performing a medically appropriate examination and/or evaluation, counseling and educating the patient/family/caregiver and independently interpreting results and communicating that information with the patient/family/caregiver with greater than 50% spent in counseling and coordination of care.         I spent 4 minutes on the separately reported service of US imaging not included in the time used to support the E/M service also reported today.      Jonathan Calderon MD   Maternal Fetal Medicine-[facility]  Office: [phone]      Pt reports that she is doing well and denies vaginal bleeding, cramping, contractions or LOF at this time. Reports active fetal movement. Reviewed when to call OB office or present to L&D for evaluation with symptoms such as decreased fetal movement, vaginal bleeding, LOF or ctxs. Pt verbalized understanding. Denies HA, visual changes or epigastric pain. Denies any additional complaints at time of appointment. Next OB appointment scheduled for today.       Vitals:    07/17/25 0936   BP: 114/75   Pulse: 86   Temp: 98.1 °F (36.7 °C)   SpO2: 100%

## 2025-07-24 ENCOUNTER — OFFICE VISIT (OUTPATIENT)
Dept: OBSTETRICS AND GYNECOLOGY | Facility: CLINIC | Age: 25
End: 2025-07-24
Payer: COMMERCIAL

## 2025-07-24 ENCOUNTER — HOSPITAL ENCOUNTER (OUTPATIENT)
Dept: ULTRASOUND IMAGING | Facility: HOSPITAL | Age: 25
Discharge: HOME | End: 2025-07-24
Admitting: OBSTETRICS & GYNECOLOGY
Payer: COMMERCIAL

## 2025-07-24 VITALS
OXYGEN SATURATION: 100 % | TEMPERATURE: 98 F | DIASTOLIC BLOOD PRESSURE: 85 MMHG | HEIGHT: 60 IN | HEART RATE: 80 BPM | WEIGHT: 181.1 LBS | SYSTOLIC BLOOD PRESSURE: 120 MMHG | BODY MASS INDEX: 35.56 KG/M2

## 2025-07-24 DIAGNOSIS — O36.5990 FETAL GROWTH RESTRICTION ANTEPARTUM: Primary | ICD-10-CM

## 2025-07-24 DIAGNOSIS — Z3A.34 34 WEEKS GESTATION OF PREGNANCY: ICD-10-CM

## 2025-07-24 DIAGNOSIS — O35.EXX0 FETAL RENAL ANOMALY, SINGLE GESTATION: ICD-10-CM

## 2025-07-24 PROCEDURE — 76819 FETAL BIOPHYS PROFIL W/O NST: CPT

## 2025-07-24 PROCEDURE — 76820 UMBILICAL ARTERY ECHO: CPT

## 2025-07-24 NOTE — PROGRESS NOTES
"MATERNAL FETAL MEDICINE CONSULT NOTE      Dear Dr Yanna Devries MD:    Thank you for your kind referral of Yanna Johnson.  As you know, she is a 24 y.o.   34w4d gestation (Estimated Date of Delivery: 25). This is a consult.     HER ANTEPARTUM COURSE IS COMPLICATED BY:  Right Pelvic Kidney   Fetal Growth Restriction, Normal Dopplers--resolved today possibly    ANEUPLOIDY SCREENING: Declined  CARRIER SCREENING: Declined    HPI: Today, denies contractions, LOF, vaginal bleeding.   She denies headache, vision changes, shortness of breath, acute changes in edema, and RUQ pain. Doing well.  Good movement.      REVIEW OF HISTORY  Past Medical History:   Diagnosis Date    Anemia      No past surgical history on file.    OB History    Para Term  AB Living   1 0 0 0 0 0   SAB IAB Ectopic Molar Multiple Live Births   0 0 0 0 0 0      # Outcome Date GA Lbr Florian/2nd Weight Sex Type Anes PTL Lv   1 Current              Social History     Socioeconomic History    Marital status: Single   Tobacco Use    Smoking status: Former     Types: Cigarettes    Smokeless tobacco: Never   Vaping Use    Vaping status: Never Used   Substance and Sexual Activity    Alcohol use: Not Currently    Drug use: Not Currently     Types: Marijuana     No family history on file.   No Known Allergies   Current Outpatient Medications on File Prior to Visit   Medication Sig Dispense Refill    prenatal vitamin (prenatal, CLASSIC, vitamin) tablet Take  by mouth Daily.       No current facility-administered medications on file prior to visit.        Past obstetric, gynecological, medical, surgical, family and social history reviewed.  Relevant lab work and imaging reviewed.    REVIEW OF SYSTEMS  As above in HPI    Vitals:    25 0943   BP: 120/85   BP Location: Left arm   Patient Position: Sitting   Pulse: 80   Temp: 98 °F (36.7 °C)   TempSrc: Temporal   SpO2: 100%   Weight: 82.1 kg (181 lb 1.6 oz)   Height: 152.4 cm (60\")         Wt " Readings from Last 3 Encounters:   25 82.1 kg (181 lb 1.6 oz)   25 79.7 kg (175 lb 12.8 oz)   07/10/25 78.2 kg (172 lb 4.8 oz)     BP Readings from Last 3 Encounters:   25 120/85   25 114/75   07/10/25 110/71     Pulse Readings from Last 3 Encounters:   25 80   25 86   07/10/25 88     Pregnancy Episode    PHYSICAL EXAM   General: No acute distress  Respiratory: No increased work of breathing  Cardiac: reg rate   Abdominal: Gravid, nontender  Extremities: No significant edema  Neuro/psych: Alert and oriented, appropriate mood      ULTRASOUND   A full ultrasound report can be found under imaging tab once finalized and signed off by reading physician  Breech presentation  Posterior placenta  AGUSTIN 11 cm which is normal  Fetal biometry is consistent with fetal growth restriction EFW 17%, AC 9%  Umbilical artery Dopplers are overall normal. 2 out of 6 waveforms are elevated. No evidence of absent or reversed end-diastolic flow  BPP       ASSESSMENT AND PLAN  Diagnoses and all orders for this visit:    1. Fetal growth restriction antepartum (Primary)    2. Fetal renal anomaly, single gestation    3. 34 weeks gestation of pregnancy         RIGHT PELVIC KIDNEY  Previously Counseled.  Right pelvic kidney today--appears more midabdomen than pelvic.   AGUSTIN is 14 cm, which is normal - the normal amniotic fluid suggests normal kidney function.   Declined NIPT  Peds Uro Referral ordered  Re-evaluate fetal kidneys at 34 weeks    Has appointment scheduled with pediatric nephrology today at 130 pm.     FETAL GROWTH RESTRICTION, NORMAL UMBILICAL ARTERY DOPPLERS--POSSIBLY RESOLVED  Previously Counseled.   -Diagnosed based on AC 9%  -Declined NIPT, due to cost  -Today: UAD continue to be normal. BPP      Reports that she has started to have very small leakage of milk. Discussed that she can collect that milk and starting at 35-36 weeks she can completed  hand expression. I discouraged  pumping at this gestation.   She expressed understanding.     Patient also asked about location to go if she did have concerns regarding labor; she was unsure if there was a NICU. Reviewed that there is a NICU. She should present to [facility] if there is a concern.     SUMMARY OF PLAN  -Referral to [facility] urology - scheduled 25  -Toxo/CMV IgG and IgM being drawn today at primary OB office  -Pt declines NIPT  -Serial growth ultrasound every 3-4 weeks after short interval (MFM)  -Twice Weekly fetal  surveillance until delivery (Split between MFM (Thursday) and primary OB (Monday))   --MFM will do BPP/UAD   --Ideally if possible, schedule about 2 - 3 days between weekly MFM and OB assessments.   -Continue routine prenatal care with primary ob team   -At this time, delivery 38-39 WEEKS     Follow-up: Weekly    Future Appointments   Date Time Provider Department Center   2025  9:00 AM [facility] US 1 [facility]   2025  9:30 AM Gwen Bauman MD [facility]   2025  9:00 AM [facility] 1 [facility]   2025  9:30 AM Malgorzata Seals APRN [facility]   2025  9:00 AM [facility] US 1 [facility]   2025  9:30 AM Malgorzata Seals APRN [facility]   2025  9:00 AM [facility] US 1 [facility]   2025  9:30 AM Malgorzata Seals APRN [facility]   2025  9:00 AM [facility] US 1 [facility]   2025  9:30 AM Gwen Bauman MD [facility]     Thank you for the consult and opportunity to care for this patient.  Please feel free to reach out with any questions or concerns.      I confirm that all copied/forwarded documentation in this record has been carefully reviewed, updated as necessary, and accurately reflects the patient's current status and plan of care.     I spent 15 minutes caring for this patient on this date of service. This time includes time spent by me in the following activities: preparing for the visit, reviewing tests,  obtaining and/or reviewing a separately obtained history, performing a medically appropriate examination and/or evaluation, counseling and educating the patient/family/caregiver and independently interpreting results and communicating that information with the patient/family/caregiver with greater than 50% spent in counseling and coordination of care.         I spent 3 minutes on the separately reported service of US imaging not included in the time used to support the E/M service also reported today.      Arline Mcintyre MD   Maternal Fetal Medicine-[facility]  Office: [phone]

## 2025-07-24 NOTE — LETTER
2025     Yanna Devries MD  [address]  [facility] [address]  [place] [address]    Patient: Yanna Johnson   YOB: 2000   Date of Visit: 2025       Dear Yanna Devries MD    Yanna Johnson was in my office today. Below is a copy of my note.    If you have questions, please do not hesitate to call me. I look forward to following Yanna along with you.         Sincerely,        Arline Mcintyre MD        CC: No Recipients    Pt reports that she is doing well and denies vaginal bleeding, cramping, contractions or LOF at this time. Reports active fetal movement. Reviewed when to call OB office or present to L&D for evaluation with symptoms such as decreased fetal movement, vaginal bleeding, LOF or ctxs. Pt verbalized understanding. Denies HA, visual changes or epigastric pain. Denies any additional complaints at time of appointment. Next OB appointment scheduled for .     Vitals:    25 0943   BP: 120/85   Pulse: 80   Temp: 98 °F (36.7 °C)   SpO2: 100%         MATERNAL FETAL MEDICINE CONSULT NOTE      Dear Dr Yanna Devries MD:    Thank you for your kind referral of Yanna Johnson.  As you know, she is a 24 y.o.   34w4d gestation (Estimated Date of Delivery: 25). This is a consult.     HER ANTEPARTUM COURSE IS COMPLICATED BY:  Right Pelvic Kidney   Fetal Growth Restriction, Normal Dopplers--resolved today possibly    ANEUPLOIDY SCREENING: Declined  CARRIER SCREENING: Declined    HPI: Today, denies contractions, LOF, vaginal bleeding.   She denies headache, vision changes, shortness of breath, acute changes in edema, and RUQ pain. Doing well.  Good movement.      REVIEW OF HISTORY  Past Medical History:   Diagnosis Date   • Anemia      No past surgical history on file.    OB History    Para Term  AB Living   1 0 0 0 0 0   SAB IAB Ectopic Molar Multiple Live Births   0 0 0 0 0 0      # Outcome Date GA Lbr Florian/2nd Weight Sex Type Anes PTL Lv   1 Current              Social  "History     Socioeconomic History   • Marital status: Single   Tobacco Use   • Smoking status: Former     Types: Cigarettes   • Smokeless tobacco: Never   Vaping Use   • Vaping status: Never Used   Substance and Sexual Activity   • Alcohol use: Not Currently   • Drug use: Not Currently     Types: Marijuana     No family history on file.   No Known Allergies   Current Outpatient Medications on File Prior to Visit   Medication Sig Dispense Refill   • prenatal vitamin (prenatal, CLASSIC, vitamin) tablet Take  by mouth Daily.       No current facility-administered medications on file prior to visit.        Past obstetric, gynecological, medical, surgical, family and social history reviewed.  Relevant lab work and imaging reviewed.    REVIEW OF SYSTEMS  As above in HPI    Vitals:    07/24/25 0943   BP: 120/85   BP Location: Left arm   Patient Position: Sitting   Pulse: 80   Temp: 98 °F (36.7 °C)   TempSrc: Temporal   SpO2: 100%   Weight: 82.1 kg (181 lb 1.6 oz)   Height: 152.4 cm (60\")         Wt Readings from Last 3 Encounters:   07/24/25 82.1 kg (181 lb 1.6 oz)   07/17/25 79.7 kg (175 lb 12.8 oz)   07/10/25 78.2 kg (172 lb 4.8 oz)     BP Readings from Last 3 Encounters:   07/24/25 120/85   07/17/25 114/75   07/10/25 110/71     Pulse Readings from Last 3 Encounters:   07/24/25 80   07/17/25 86   07/10/25 88     Pregnancy Episode    PHYSICAL EXAM   General: No acute distress  Respiratory: No increased work of breathing  Cardiac: reg rate   Abdominal: Gravid, nontender  Extremities: No significant edema  Neuro/psych: Alert and oriented, appropriate mood      ULTRASOUND   A full ultrasound report can be found under imaging tab once finalized and signed off by reading physician  Breech presentation  Posterior placenta  AGUSTIN 11 cm which is normal  Fetal biometry is consistent with fetal growth restriction EFW 17%, AC 9%  Umbilical artery Dopplers are overall normal. 2 out of 6 waveforms are elevated. No evidence of absent or " reversed end-diastolic flow  BPP       ASSESSMENT AND PLAN  Diagnoses and all orders for this visit:    1. Fetal growth restriction antepartum (Primary)    2. Fetal renal anomaly, single gestation    3. 34 weeks gestation of pregnancy         RIGHT PELVIC KIDNEY  Previously Counseled.  Right pelvic kidney today--appears more midabdomen than pelvic.   AGUSTIN is 14 cm, which is normal - the normal amniotic fluid suggests normal kidney function.   Declined NIPT  Peds Uro Referral ordered  Re-evaluate fetal kidneys at 34 weeks    Has appointment scheduled with pediatric nephrology today at 130 pm.     FETAL GROWTH RESTRICTION, NORMAL UMBILICAL ARTERY DOPPLERS--POSSIBLY RESOLVED  Previously Counseled.   -Diagnosed based on AC 9%  -Declined NIPT, due to cost  -Today: UAD continue to be normal. BPP      Reports that she has started to have very small leakage of milk. Discussed that she can collect that milk and starting at 35-36 weeks she can completed  hand expression. I discouraged pumping at this gestation.   She expressed understanding.     Patient also asked about location to go if she did have concerns regarding labor; she was unsure if there was a NICU. Reviewed that there is a NICU. She should present to [facility] if there is a concern.     SUMMARY OF PLAN  -Referral to peds urology - scheduled 25  -Toxo/CMV IgG and IgM being drawn today at primary OB office  -Pt declines NIPT  -Serial growth ultrasound every 3-4 weeks after short interval (MFM)  -Twice Weekly fetal  surveillance until delivery (Split between MFM (Thursday) and primary OB (Monday))   --MFM will do BPP/UAD   --Ideally if possible, schedule about 2 - 3 days between weekly MFM and OB assessments.   -Continue routine prenatal care with primary ob team   -At this time, delivery 38-39 WEEKS     Follow-up: Weekly    Future Appointments   Date Time Provider Department Center   2025  9:00 AM [facility] US 1 [facility]    7/31/2025  9:30 AM Gwen Bauman MD [facility]   8/7/2025  9:00 AM [facility] US 1 [facility]   8/7/2025  9:30 AM Malgorzata Seals, JAQUAN [facility]   8/14/2025  9:00 AM [facility] US 1 [facility]   8/14/2025  9:30 AM Malgorzata Seals APRN [facility]   8/21/2025  9:00 AM [facility] US 1 [facility]   8/21/2025  9:30 AM Malgorzata Seals APRN [facility]   8/28/2025  9:00 AM [facility] US 1 [facility]   8/28/2025  9:30 AM Gwen Bauman MD [facility]     Thank you for the consult and opportunity to care for this patient.  Please feel free to reach out with any questions or concerns.      I confirm that all copied/forwarded documentation in this record has been carefully reviewed, updated as necessary, and accurately reflects the patient's current status and plan of care.     I spent 15 minutes caring for this patient on this date of service. This time includes time spent by me in the following activities: preparing for the visit, reviewing tests, obtaining and/or reviewing a separately obtained history, performing a medically appropriate examination and/or evaluation, counseling and educating the patient/family/caregiver and independently interpreting results and communicating that information with the patient/family/caregiver with greater than 50% spent in counseling and coordination of care.         I spent 3 minutes on the separately reported service of US imaging not included in the time used to support the E/M service also reported today.      Arline Mcintyre MD   Maternal Fetal Medicine-[facility]  Office: [phone]

## 2025-07-24 NOTE — PROGRESS NOTES
Pt reports that she is doing well and denies vaginal bleeding, cramping, contractions or LOF at this time. Reports active fetal movement. Reviewed when to call OB office or present to L&D for evaluation with symptoms such as decreased fetal movement, vaginal bleeding, LOF or ctxs. Pt verbalized understanding. Denies HA, visual changes or epigastric pain. Denies any additional complaints at time of appointment. Next OB appointment scheduled for 7/28.     Vitals:    07/24/25 0943   BP: 120/85   Pulse: 80   Temp: 98 °F (36.7 °C)   SpO2: 100%

## 2025-07-31 ENCOUNTER — OFFICE VISIT (OUTPATIENT)
Dept: OBSTETRICS AND GYNECOLOGY | Facility: CLINIC | Age: 25
End: 2025-07-31
Payer: COMMERCIAL

## 2025-07-31 ENCOUNTER — HOSPITAL ENCOUNTER (OUTPATIENT)
Dept: ULTRASOUND IMAGING | Facility: HOSPITAL | Age: 25
Discharge: HOME | End: 2025-07-31
Admitting: OBSTETRICS & GYNECOLOGY
Payer: COMMERCIAL

## 2025-07-31 VITALS
DIASTOLIC BLOOD PRESSURE: 78 MMHG | HEART RATE: 87 BPM | WEIGHT: 179 LBS | OXYGEN SATURATION: 100 % | BODY MASS INDEX: 35.14 KG/M2 | SYSTOLIC BLOOD PRESSURE: 126 MMHG | HEIGHT: 60 IN | TEMPERATURE: 98.1 F

## 2025-07-31 DIAGNOSIS — O35.EXX0 FETAL RENAL ANOMALY, SINGLE GESTATION: ICD-10-CM

## 2025-07-31 DIAGNOSIS — O36.5990 FETAL GROWTH RESTRICTION ANTEPARTUM: Primary | ICD-10-CM

## 2025-07-31 PROCEDURE — 76819 FETAL BIOPHYS PROFIL W/O NST: CPT | Performed by: OBSTETRICS & GYNECOLOGY

## 2025-07-31 PROCEDURE — 76816 OB US FOLLOW-UP PER FETUS: CPT

## 2025-07-31 PROCEDURE — 76820 UMBILICAL ARTERY ECHO: CPT | Performed by: OBSTETRICS & GYNECOLOGY

## 2025-07-31 PROCEDURE — 76820 UMBILICAL ARTERY ECHO: CPT

## 2025-07-31 PROCEDURE — 76819 FETAL BIOPHYS PROFIL W/O NST: CPT

## 2025-07-31 PROCEDURE — 76816 OB US FOLLOW-UP PER FETUS: CPT | Performed by: OBSTETRICS & GYNECOLOGY

## 2025-07-31 NOTE — PROGRESS NOTES
MATERNAL FETAL MEDICINE CONSULT NOTE      Dear Dr Yanna Devries MD:    Thank you for your kind referral of Yanna Bustillosofia.  As you know, she is a 24 y.o.   35w4d gestation (Estimated Date of Delivery: 25). This is a consult.     HER ANTEPARTUM COURSE IS COMPLICATED BY:  Right Pelvic Kidney   Fetal Growth Restriction, Normal Dopplers--resolved today possibly    ANEUPLOIDY SCREENING: Declined  CARRIER SCREENING: Declined    HPI: Today, denies contractions, LOF, vaginal bleeding.   She denies headache, vision changes, shortness of breath, acute changes in edema, and RUQ pain. Doing well.  Good movement.      REVIEW OF HISTORY  Past Medical History:   Diagnosis Date    Anemia      No past surgical history on file.    OB History    Para Term  AB Living   1 0 0 0 0 0   SAB IAB Ectopic Molar Multiple Live Births   0 0 0 0 0 0      # Outcome Date GA Lbr Florian/2nd Weight Sex Type Anes PTL Lv   1 Current              Social History     Socioeconomic History    Marital status: Single   Tobacco Use    Smoking status: Former     Types: Cigarettes    Smokeless tobacco: Never   Vaping Use    Vaping status: Never Used   Substance and Sexual Activity    Alcohol use: Not Currently    Drug use: Not Currently     Types: Marijuana     No family history on file.   No Known Allergies   Current Outpatient Medications on File Prior to Visit   Medication Sig Dispense Refill    prenatal vitamin (prenatal, CLASSIC, vitamin) tablet Take  by mouth Daily.       No current facility-administered medications on file prior to visit.        Past obstetric, gynecological, medical, surgical, family and social history reviewed.  Relevant lab work and imaging reviewed.    REVIEW OF SYSTEMS  As above in HPI    Vitals:    25 0947 25 1003   BP: 132/86 126/78   BP Location: Left arm Left arm   Patient Position: Sitting Sitting   Pulse: 87    Temp: 98.1 °F (36.7 °C)    TempSrc: Oral    SpO2: 100%    Weight: 81.2 kg (179 lb)   "  Height: 152.4 cm (60\")          Wt Readings from Last 3 Encounters:   25 81.2 kg (179 lb)   25 82.1 kg (181 lb 1.6 oz)   25 79.7 kg (175 lb 12.8 oz)     BP Readings from Last 3 Encounters:   25 126/78   25 120/85   25 114/75     Pulse Readings from Last 3 Encounters:   25 87   25 80   25 86     Pregnancy Episode    PHYSICAL EXAM   General: No acute distress  Respiratory: No increased work of breathing  Cardiac: reg rate   Abdominal: Gravid, nontender  Extremities: No significant edema  Neuro/psych: Alert and oriented, appropriate mood      ULTRASOUND   A full ultrasound report can be found under imaging tab once finalized and signed off by reading physician  Cephalic presentation.  Posterior placenta.  AGUSTIN 19 cm, which is normal.   EFW 2357 g (16%, AC 9%)  BPP   UA dopplers normal without elevated, absent, or reversed flow noted.   Right pelvic/midline kidney again noted.       ASSESSMENT AND PLAN  Diagnoses and all orders for this visit:    1. Fetal growth restriction antepartum (Primary)    2. Fetal renal anomaly, single gestation           RIGHT PELVIC KIDNEY  Previously Counseled.  Right pelvic kidney today--appears more midabdomen than pelvic.   AGUSTIN is 14 cm, which is normal - the normal amniotic fluid suggests normal kidney function.   Declined NIPT  Peds Uro Referral ordered  Re-evaluate fetal kidneys at 34 weeks    Has appointment scheduled with pediatric nephrology today at 130 pm.     FETAL GROWTH RESTRICTION, NORMAL UMBILICAL ARTERY DOPPLERS--POSSIBLY RESOLVED  Previously Counseled.   -Diagnosed based on AC 9%  -Declined NIPT, due to cost  -Today: UAD continue to be normal. BPP      Reports that she has started to have very small leakage of milk. Discussed that she can collect that milk and starting at 35-36 weeks she can completed  hand expression. I discouraged pumping at this gestation.   She expressed understanding.     Patient " also asked about location to go if she did have concerns regarding labor; she was unsure if there was a NICU. Reviewed that there is a NICU. She should present to [facility] if there is a concern.     SUMMARY OF PLAN  -Referral to peds urology - scheduled 25  -Toxo/CMV IgG and IgM being drawn today at primary OB office  -Pt declines NIPT  -Serial growth ultrasound every 3-4 weeks after short interval (MFM)  -Twice Weekly fetal  surveillance until delivery (Split between MFM (Thursday) and primary OB (Monday))-->Please make sure scheduled--pt unsure if she is getting an US Monday   --MFM will do BPP/UAD   --Ideally if possible, schedule about 2 - 3 days between weekly MFM and OB assessments.   -Continue routine prenatal care with primary ob team   -At this time, delivery 38-39 WEEKS     Follow-up: Weekly    Future Appointments   Date Time Provider Department Center   2025  9:00 AM [facility] US 1 [facility]   2025  9:30 AM Malgorzata Seals APRN [facility]   2025  9:00 AM [facility] US 1 [facility]   2025  9:30 AM Malgorzata Seals APRN [facility]   2025  9:00 AM [facility] US 1 [facility]   2025  9:30 AM Malgorzata Seals APRN [facility]   2025  9:00 AM [facility] US 1 [facility]   2025  9:30 AM Gwen Bauman MD [facility]     Thank you for the consult and opportunity to care for this patient.  Please feel free to reach out with any questions or concerns.      I confirm that all copied/forwarded documentation in this record has been carefully reviewed, updated as necessary, and accurately reflects the patient's current status and plan of care.     I spent 15 minutes caring for this patient on this date of service. This time includes time spent by me in the following activities: preparing for the visit, reviewing tests, obtaining and/or reviewing a separately obtained history, performing a medically appropriate examination  and/or evaluation, counseling and educating the patient/family/caregiver and independently interpreting results and communicating that information with the patient/family/caregiver with greater than 50% spent in counseling and coordination of care.         I spent 3 minutes on the separately reported service of US imaging not included in the time used to support the E/M service also reported today.      Arline Mcintyre MD   Maternal Fetal Medicine-[facility]  Office: [phone]

## 2025-07-31 NOTE — PROGRESS NOTES
Pt reports that she is doing well and denies vaginal bleeding, or LOF at this time. Notes irregular cramping, tightness and contractions, denies consistency. Reports active fetal movement. Reviewed when to call OB office or present to L&D for evaluation with symptoms such as decreased fetal movement, vaginal bleeding, LOF or ctxs. Pt verbalized understanding. Denies HA or epigastric pain. Reports occasional floaters, denies consistency.  Notes a decrease in appetite in last few days. Next OB appointment scheduled for 8/4.        Vitals:    07/31/25 0947   BP: 132/86   Pulse: 87   Temp: 98.1 °F (36.7 °C)   SpO2: 100%

## 2025-07-31 NOTE — LETTER
2025     Yanna Devries MD  [address]  [facility] [address]  [facility] [address]    Patient: Yanna Johnson   YOB: 2000   Date of Visit: 2025       Dear Yanna Devries MD    Yanna Johnson was in my office today. Below is a copy of my note.    If you have questions, please do not hesitate to call me. I look forward to following Yanna along with you.         Sincerely,        Gwen Bauman MD    MATERNAL FETAL MEDICINE CONSULT NOTE      Dear Dr Yanna Devries MD:    Thank you for your kind referral of Yanna Johnson.  As you know, she is a 24 y.o.   35w4d gestation (Estimated Date of Delivery: 25). This is a consult.     HER ANTEPARTUM COURSE IS COMPLICATED BY:  Right Pelvic Kidney   Fetal Growth Restriction, Normal Dopplers--resolved today possibly    ANEUPLOIDY SCREENING: Declined  CARRIER SCREENING: Declined    HPI: Today, denies contractions, LOF, vaginal bleeding.   She denies headache, vision changes, shortness of breath, acute changes in edema, and RUQ pain. Doing well.  Good movement.      REVIEW OF HISTORY  Past Medical History:   Diagnosis Date   • Anemia      No past surgical history on file.    OB History    Para Term  AB Living   1 0 0 0 0 0   SAB IAB Ectopic Molar Multiple Live Births   0 0 0 0 0 0      # Outcome Date GA Lbr Florian/2nd Weight Sex Type Anes PTL Lv   1 Current              Social History     Socioeconomic History   • Marital status: Single   Tobacco Use   • Smoking status: Former     Types: Cigarettes   • Smokeless tobacco: Never   Vaping Use   • Vaping status: Never Used   Substance and Sexual Activity   • Alcohol use: Not Currently   • Drug use: Not Currently     Types: Marijuana     No family history on file.   No Known Allergies   Current Outpatient Medications on File Prior to Visit   Medication Sig Dispense Refill   • prenatal vitamin (prenatal, CLASSIC, vitamin) tablet Take  by mouth Daily.       No current facility-administered medications on  "file prior to visit.        Past obstetric, gynecological, medical, surgical, family and social history reviewed.  Relevant lab work and imaging reviewed.    REVIEW OF SYSTEMS  As above in HPI    Vitals:    07/31/25 0947 07/31/25 1003   BP: 132/86 126/78   BP Location: Left arm Left arm   Patient Position: Sitting Sitting   Pulse: 87    Temp: 98.1 °F (36.7 °C)    TempSrc: Oral    SpO2: 100%    Weight: 81.2 kg (179 lb)    Height: 152.4 cm (60\")          Wt Readings from Last 3 Encounters:   07/31/25 81.2 kg (179 lb)   07/24/25 82.1 kg (181 lb 1.6 oz)   07/17/25 79.7 kg (175 lb 12.8 oz)     BP Readings from Last 3 Encounters:   07/31/25 126/78   07/24/25 120/85   07/17/25 114/75     Pulse Readings from Last 3 Encounters:   07/31/25 87   07/24/25 80   07/17/25 86     Pregnancy Episode    PHYSICAL EXAM   General: No acute distress  Respiratory: No increased work of breathing  Cardiac: reg rate   Abdominal: Gravid, nontender  Extremities: No significant edema  Neuro/psych: Alert and oriented, appropriate mood      ULTRASOUND   A full ultrasound report can be found under imaging tab once finalized and signed off by reading physician  Cephalic presentation.  Posterior placenta.  AGUSTIN 19 cm, which is normal.   EFW 2357 g (16%, AC 9%)  BPP 8/8  UA dopplers normal without elevated, absent, or reversed flow noted.   Right pelvic/midline kidney again noted.       ASSESSMENT AND PLAN  Diagnoses and all orders for this visit:    1. Fetal growth restriction antepartum (Primary)    2. Fetal renal anomaly, single gestation           RIGHT PELVIC KIDNEY  Previously Counseled.  Right pelvic kidney today--appears more midabdomen than pelvic.   AGUSTIN is 14 cm, which is normal - the normal amniotic fluid suggests normal kidney function.   Declined NIPT  Peds Uro Referral ordered  Re-evaluate fetal kidneys at 34 weeks    Has appointment scheduled with pediatric nephrology today at 130 pm.     FETAL GROWTH RESTRICTION, NORMAL UMBILICAL ARTERY " DOPPLERS--POSSIBLY RESOLVED  Previously Counseled.   -Diagnosed based on AC 9%  -Declined NIPT, due to cost  -Today: UAD continue to be normal. BPP      Reports that she has started to have very small leakage of milk. Discussed that she can collect that milk and starting at 35-36 weeks she can completed  hand expression. I discouraged pumping at this gestation.   She expressed understanding.     Patient also asked about location to go if she did have concerns regarding labor; she was unsure if there was a NICU. Reviewed that there is a NICU. She should present to [facility] if there is a concern.     SUMMARY OF PLAN  -Referral to [facility] urology - scheduled 25  -Toxo/CMV IgG and IgM being drawn today at primary OB office  -Pt declines NIPT  -Serial growth ultrasound every 3-4 weeks after short interval (MFM)  -Twice Weekly fetal  surveillance until delivery (Split between MFM (Thursday) and primary OB (Monday))-->Please make sure scheduled--pt unsure if she is getting an US Monday   --MFM will do BPP/UAD   --Ideally if possible, schedule about 2 - 3 days between weekly MFM and OB assessments.   -Continue routine prenatal care with primary ob team   -At this time, delivery 38-39 WEEKS     Follow-up: Weekly    Future Appointments   Date Time Provider Department Center   2025  9:00 AM [facility] US 1 [facility]   2025  9:30 AM Malgorzata Seals APRN [facility]   2025  9:00 AM [facility] US 1 [facility]   2025  9:30 AM Malgorzata Seals APRN [facility]   2025  9:00 AM [facility] US 1 [facility]   2025  9:30 AM Malgorzata Seals APRN [facility]   2025  9:00 AM [facility] US 1 [facility]   2025  9:30 AM Gwen Bauman MD [facility]     Thank you for the consult and opportunity to care for this patient.  Please feel free to reach out with any questions or concerns.      I confirm that all copied/forwarded documentation in this  record has been carefully reviewed, updated as necessary, and accurately reflects the patient's current status and plan of care.     I spent 15 minutes caring for this patient on this date of service. This time includes time spent by me in the following activities: preparing for the visit, reviewing tests, obtaining and/or reviewing a separately obtained history, performing a medically appropriate examination and/or evaluation, counseling and educating the patient/family/caregiver and independently interpreting results and communicating that information with the patient/family/caregiver with greater than 50% spent in counseling and coordination of care.         I spent 3 minutes on the separately reported service of US imaging not included in the time used to support the E/M service also reported today.      Arline Mcintyre MD   Maternal Fetal Medicine-[facility]  Office: [phone]

## 2025-08-07 ENCOUNTER — OFFICE VISIT (OUTPATIENT)
Dept: OBSTETRICS AND GYNECOLOGY | Facility: CLINIC | Age: 25
End: 2025-08-07
Payer: COMMERCIAL

## 2025-08-07 ENCOUNTER — HOSPITAL ENCOUNTER (OUTPATIENT)
Dept: ULTRASOUND IMAGING | Facility: HOSPITAL | Age: 25
Discharge: HOME | End: 2025-08-07
Admitting: OBSTETRICS & GYNECOLOGY
Payer: COMMERCIAL

## 2025-08-07 VITALS
OXYGEN SATURATION: 100 % | DIASTOLIC BLOOD PRESSURE: 82 MMHG | BODY MASS INDEX: 35.77 KG/M2 | SYSTOLIC BLOOD PRESSURE: 129 MMHG | HEART RATE: 72 BPM | WEIGHT: 182.2 LBS | HEIGHT: 60 IN | TEMPERATURE: 98.1 F

## 2025-08-07 DIAGNOSIS — O36.5990 FETAL GROWTH RESTRICTION ANTEPARTUM: Primary | ICD-10-CM

## 2025-08-07 DIAGNOSIS — O35.EXX0 FETAL RENAL ANOMALY, SINGLE GESTATION: ICD-10-CM

## 2025-08-07 PROCEDURE — 76819 FETAL BIOPHYS PROFIL W/O NST: CPT

## 2025-08-07 PROCEDURE — 76820 UMBILICAL ARTERY ECHO: CPT

## 2025-08-14 ENCOUNTER — OFFICE VISIT (OUTPATIENT)
Dept: OBSTETRICS AND GYNECOLOGY | Facility: CLINIC | Age: 25
End: 2025-08-14
Payer: COMMERCIAL

## 2025-08-14 ENCOUNTER — HOSPITAL ENCOUNTER (OUTPATIENT)
Dept: ULTRASOUND IMAGING | Facility: HOSPITAL | Age: 25
Discharge: HOME | End: 2025-08-14
Admitting: OBSTETRICS & GYNECOLOGY
Payer: COMMERCIAL

## 2025-08-14 VITALS
HEART RATE: 80 BPM | HEIGHT: 60 IN | WEIGHT: 184.44 LBS | SYSTOLIC BLOOD PRESSURE: 135 MMHG | BODY MASS INDEX: 36.21 KG/M2 | TEMPERATURE: 97.8 F | DIASTOLIC BLOOD PRESSURE: 84 MMHG | OXYGEN SATURATION: 100 %

## 2025-08-14 DIAGNOSIS — O35.EXX0 FETAL RENAL ANOMALY, SINGLE GESTATION: Primary | ICD-10-CM

## 2025-08-14 PROCEDURE — 76819 FETAL BIOPHYS PROFIL W/O NST: CPT

## 2025-08-14 PROCEDURE — 76816 OB US FOLLOW-UP PER FETUS: CPT

## 2025-08-14 PROCEDURE — 76820 UMBILICAL ARTERY ECHO: CPT

## 2025-08-18 ENCOUNTER — HOSPITAL ENCOUNTER (OUTPATIENT)
Dept: LABOR AND DELIVERY | Facility: HOSPITAL | Age: 25
Discharge: HOME | End: 2025-08-18
Payer: COMMERCIAL